# Patient Record
Sex: FEMALE | Race: WHITE | NOT HISPANIC OR LATINO
[De-identification: names, ages, dates, MRNs, and addresses within clinical notes are randomized per-mention and may not be internally consistent; named-entity substitution may affect disease eponyms.]

---

## 2017-01-26 ENCOUNTER — APPOINTMENT (OUTPATIENT)
Age: 77
End: 2017-01-26

## 2017-02-02 ENCOUNTER — APPOINTMENT (OUTPATIENT)
Age: 77
End: 2017-02-02

## 2017-02-02 DIAGNOSIS — L02.91 CUTANEOUS ABSCESS, UNSPECIFIED: ICD-10-CM

## 2017-02-16 ENCOUNTER — APPOINTMENT (OUTPATIENT)
Age: 77
End: 2017-02-16

## 2017-03-01 ENCOUNTER — APPOINTMENT (OUTPATIENT)
Dept: HEMATOLOGY ONCOLOGY | Facility: CLINIC | Age: 77
End: 2017-03-01

## 2017-03-09 ENCOUNTER — APPOINTMENT (OUTPATIENT)
Age: 77
End: 2017-03-09

## 2017-04-03 ENCOUNTER — APPOINTMENT (OUTPATIENT)
Dept: HEMATOLOGY ONCOLOGY | Facility: CLINIC | Age: 77
End: 2017-04-03

## 2017-04-03 VITALS
DIASTOLIC BLOOD PRESSURE: 41 MMHG | HEART RATE: 62 BPM | WEIGHT: 150 LBS | SYSTOLIC BLOOD PRESSURE: 121 MMHG | HEIGHT: 61 IN | BODY MASS INDEX: 28.32 KG/M2 | TEMPERATURE: 97.2 F | RESPIRATION RATE: 18 BRPM

## 2017-04-03 DIAGNOSIS — Z87.39 PERSONAL HISTORY OF OTHER DISEASES OF THE MUSCULOSKELETAL SYSTEM AND CONNECTIVE TISSUE: ICD-10-CM

## 2017-04-03 DIAGNOSIS — D64.9 ANEMIA, UNSPECIFIED: ICD-10-CM

## 2017-04-03 DIAGNOSIS — I27.2 OTHER SECONDARY PULMONARY HYPERTENSION: ICD-10-CM

## 2017-04-03 DIAGNOSIS — Z86.79 PERSONAL HISTORY OF OTHER DISEASES OF THE CIRCULATORY SYSTEM: ICD-10-CM

## 2017-04-03 DIAGNOSIS — I50.30 UNSPECIFIED DIASTOLIC (CONGESTIVE) HEART FAILURE: ICD-10-CM

## 2017-04-03 DIAGNOSIS — Z98.890 OTHER SPECIFIED POSTPROCEDURAL STATES: ICD-10-CM

## 2017-04-03 DIAGNOSIS — Z80.0 FAMILY HISTORY OF MALIGNANT NEOPLASM OF DIGESTIVE ORGANS: ICD-10-CM

## 2017-04-03 DIAGNOSIS — Z82.5 FAMILY HISTORY OF ASTHMA AND OTHER CHRONIC LOWER RESPIRATORY DISEASES: ICD-10-CM

## 2017-04-05 ENCOUNTER — RESULT REVIEW (OUTPATIENT)
Age: 77
End: 2017-04-05

## 2017-04-06 LAB — TSH SERPL DL<=0.005 MIU/L-ACNC: 1.17 UIU/ML

## 2017-05-17 ENCOUNTER — OUTPATIENT (OUTPATIENT)
Dept: OUTPATIENT SERVICES | Facility: HOSPITAL | Age: 77
LOS: 1 days | Discharge: HOME | End: 2017-05-17

## 2017-05-22 ENCOUNTER — OUTPATIENT (OUTPATIENT)
Dept: OUTPATIENT SERVICES | Facility: HOSPITAL | Age: 77
LOS: 1 days | Discharge: HOME | End: 2017-05-22

## 2017-06-05 ENCOUNTER — OUTPATIENT (OUTPATIENT)
Dept: OUTPATIENT SERVICES | Facility: HOSPITAL | Age: 77
LOS: 1 days | Discharge: HOME | End: 2017-06-05

## 2017-06-05 ENCOUNTER — APPOINTMENT (OUTPATIENT)
Dept: HEMATOLOGY ONCOLOGY | Facility: CLINIC | Age: 77
End: 2017-06-05

## 2017-06-05 VITALS
HEIGHT: 61 IN | DIASTOLIC BLOOD PRESSURE: 41 MMHG | TEMPERATURE: 96.1 F | SYSTOLIC BLOOD PRESSURE: 121 MMHG | BODY MASS INDEX: 28.7 KG/M2 | HEART RATE: 60 BPM | WEIGHT: 152 LBS

## 2017-06-05 DIAGNOSIS — I27.20 PULMONARY HYPERTENSION, UNSPECIFIED: ICD-10-CM

## 2017-06-05 DIAGNOSIS — L73.2 HIDRADENITIS SUPPURATIVA: ICD-10-CM

## 2017-06-05 DIAGNOSIS — I35.8 OTHER NONRHEUMATIC AORTIC VALVE DISORDERS: ICD-10-CM

## 2017-06-05 DIAGNOSIS — E11.9 TYPE 2 DIABETES MELLITUS WITHOUT COMPLICATIONS: ICD-10-CM

## 2017-06-05 DIAGNOSIS — Z00.00 ENCOUNTER FOR GENERAL ADULT MEDICAL EXAMINATION W/OUT ABNORMAL FINDINGS: ICD-10-CM

## 2017-06-05 DIAGNOSIS — I50.30 UNSPECIFIED DIASTOLIC (CONGESTIVE) HEART FAILURE: ICD-10-CM

## 2017-06-05 DIAGNOSIS — E78.5 HYPERLIPIDEMIA, UNSPECIFIED: ICD-10-CM

## 2017-06-05 DIAGNOSIS — I48.91 UNSPECIFIED ATRIAL FIBRILLATION: ICD-10-CM

## 2017-06-06 ENCOUNTER — OUTPATIENT (OUTPATIENT)
Dept: OUTPATIENT SERVICES | Facility: HOSPITAL | Age: 77
LOS: 1 days | Discharge: HOME | End: 2017-06-06

## 2017-06-06 DIAGNOSIS — E78.5 HYPERLIPIDEMIA, UNSPECIFIED: ICD-10-CM

## 2017-06-06 DIAGNOSIS — L73.2 HIDRADENITIS SUPPURATIVA: ICD-10-CM

## 2017-06-06 DIAGNOSIS — I48.91 UNSPECIFIED ATRIAL FIBRILLATION: ICD-10-CM

## 2017-06-06 DIAGNOSIS — I27.20 PULMONARY HYPERTENSION, UNSPECIFIED: ICD-10-CM

## 2017-06-06 DIAGNOSIS — E11.9 TYPE 2 DIABETES MELLITUS WITHOUT COMPLICATIONS: ICD-10-CM

## 2017-06-06 DIAGNOSIS — I50.30 UNSPECIFIED DIASTOLIC (CONGESTIVE) HEART FAILURE: ICD-10-CM

## 2017-06-06 DIAGNOSIS — I35.8 OTHER NONRHEUMATIC AORTIC VALVE DISORDERS: ICD-10-CM

## 2017-06-06 LAB
BASOPHILS # BLD: 0.02 TH/MM3
BASOPHILS NFR BLD: 0.3 %
EOSINOPHIL # BLD: 0.11 TH/MM3
EOSINOPHIL NFR BLD: 1.6 %
ERYTHROCYTE [DISTWIDTH] IN BLOOD BY AUTOMATED COUNT: 14.7 %
GRANULOCYTES # BLD: 4.47 TH/MM3
GRANULOCYTES NFR BLD: 66 %
HCT VFR BLD AUTO: 30 %
HGB BLD-MCNC: 9.7 G/DL
IMM GRANULOCYTES # BLD: 0.06 TH/MM3
IMM GRANULOCYTES NFR BLD: 0.9 %
LYMPHOCYTES # BLD: 1.55 TH/MM3
LYMPHOCYTES NFR BLD: 22.9 %
MCH RBC QN AUTO: 33.4 PG
MCHC RBC AUTO-ENTMCNC: 32.3 G/DL
MCV RBC AUTO: 103.4 FL
MONOCYTES # BLD: 0.56 TH/MM3
MONOCYTES NFR BLD: 8.3 %
PLATELET # BLD: 133 TH/MM3
PMV BLD AUTO: 11.5 FL
RBC # BLD AUTO: 2.9 MIL/MM3
WBC # BLD: 6.77 TH/MM3

## 2017-06-16 ENCOUNTER — OUTPATIENT (OUTPATIENT)
Dept: OUTPATIENT SERVICES | Facility: HOSPITAL | Age: 77
LOS: 1 days | Discharge: HOME | End: 2017-06-16

## 2017-06-16 DIAGNOSIS — I27.20 PULMONARY HYPERTENSION, UNSPECIFIED: ICD-10-CM

## 2017-06-16 DIAGNOSIS — I48.91 UNSPECIFIED ATRIAL FIBRILLATION: ICD-10-CM

## 2017-06-16 DIAGNOSIS — E78.5 HYPERLIPIDEMIA, UNSPECIFIED: ICD-10-CM

## 2017-06-16 DIAGNOSIS — I50.30 UNSPECIFIED DIASTOLIC (CONGESTIVE) HEART FAILURE: ICD-10-CM

## 2017-06-16 DIAGNOSIS — I35.8 OTHER NONRHEUMATIC AORTIC VALVE DISORDERS: ICD-10-CM

## 2017-06-16 DIAGNOSIS — L73.2 HIDRADENITIS SUPPURATIVA: ICD-10-CM

## 2017-06-16 DIAGNOSIS — E11.9 TYPE 2 DIABETES MELLITUS WITHOUT COMPLICATIONS: ICD-10-CM

## 2017-06-25 ENCOUNTER — INPATIENT (INPATIENT)
Facility: HOSPITAL | Age: 77
LOS: 11 days | Discharge: NURSING, REHAB OR LONG-TERM CARE | End: 2017-07-07
Attending: HOSPITALIST

## 2017-06-25 DIAGNOSIS — E78.5 HYPERLIPIDEMIA, UNSPECIFIED: ICD-10-CM

## 2017-06-25 DIAGNOSIS — E11.9 TYPE 2 DIABETES MELLITUS WITHOUT COMPLICATIONS: ICD-10-CM

## 2017-06-25 DIAGNOSIS — I48.91 UNSPECIFIED ATRIAL FIBRILLATION: ICD-10-CM

## 2017-06-25 DIAGNOSIS — I50.30 UNSPECIFIED DIASTOLIC (CONGESTIVE) HEART FAILURE: ICD-10-CM

## 2017-06-25 DIAGNOSIS — L73.2 HIDRADENITIS SUPPURATIVA: ICD-10-CM

## 2017-06-25 DIAGNOSIS — I27.20 PULMONARY HYPERTENSION, UNSPECIFIED: ICD-10-CM

## 2017-06-25 DIAGNOSIS — I35.8 OTHER NONRHEUMATIC AORTIC VALVE DISORDERS: ICD-10-CM

## 2017-06-28 DIAGNOSIS — R79.9 ABNORMAL FINDING OF BLOOD CHEMISTRY, UNSPECIFIED: ICD-10-CM

## 2017-07-08 ENCOUNTER — OUTPATIENT (OUTPATIENT)
Dept: OUTPATIENT SERVICES | Facility: HOSPITAL | Age: 77
LOS: 1 days | Discharge: HOME | End: 2017-07-08

## 2017-07-08 DIAGNOSIS — I35.8 OTHER NONRHEUMATIC AORTIC VALVE DISORDERS: ICD-10-CM

## 2017-07-08 DIAGNOSIS — E11.9 TYPE 2 DIABETES MELLITUS WITHOUT COMPLICATIONS: ICD-10-CM

## 2017-07-08 DIAGNOSIS — I50.30 UNSPECIFIED DIASTOLIC (CONGESTIVE) HEART FAILURE: ICD-10-CM

## 2017-07-08 DIAGNOSIS — I27.20 PULMONARY HYPERTENSION, UNSPECIFIED: ICD-10-CM

## 2017-07-08 DIAGNOSIS — E78.5 HYPERLIPIDEMIA, UNSPECIFIED: ICD-10-CM

## 2017-07-08 DIAGNOSIS — I48.91 UNSPECIFIED ATRIAL FIBRILLATION: ICD-10-CM

## 2017-07-08 DIAGNOSIS — L73.2 HIDRADENITIS SUPPURATIVA: ICD-10-CM

## 2017-07-08 DIAGNOSIS — R94.5 ABNORMAL RESULTS OF LIVER FUNCTION STUDIES: ICD-10-CM

## 2017-07-11 ENCOUNTER — OUTPATIENT (OUTPATIENT)
Dept: OUTPATIENT SERVICES | Facility: HOSPITAL | Age: 77
LOS: 1 days | Discharge: HOME | End: 2017-07-11

## 2017-07-11 DIAGNOSIS — I48.91 UNSPECIFIED ATRIAL FIBRILLATION: ICD-10-CM

## 2017-07-11 DIAGNOSIS — L73.2 HIDRADENITIS SUPPURATIVA: ICD-10-CM

## 2017-07-11 DIAGNOSIS — I27.20 PULMONARY HYPERTENSION, UNSPECIFIED: ICD-10-CM

## 2017-07-11 DIAGNOSIS — E11.9 TYPE 2 DIABETES MELLITUS WITHOUT COMPLICATIONS: ICD-10-CM

## 2017-07-11 DIAGNOSIS — I35.8 OTHER NONRHEUMATIC AORTIC VALVE DISORDERS: ICD-10-CM

## 2017-07-11 DIAGNOSIS — E78.5 HYPERLIPIDEMIA, UNSPECIFIED: ICD-10-CM

## 2017-07-11 DIAGNOSIS — I50.30 UNSPECIFIED DIASTOLIC (CONGESTIVE) HEART FAILURE: ICD-10-CM

## 2017-07-12 ENCOUNTER — OUTPATIENT (OUTPATIENT)
Dept: OUTPATIENT SERVICES | Facility: HOSPITAL | Age: 77
LOS: 1 days | Discharge: HOME | End: 2017-07-12

## 2017-07-12 DIAGNOSIS — I48.91 UNSPECIFIED ATRIAL FIBRILLATION: ICD-10-CM

## 2017-07-12 DIAGNOSIS — E78.5 HYPERLIPIDEMIA, UNSPECIFIED: ICD-10-CM

## 2017-07-12 DIAGNOSIS — I27.20 PULMONARY HYPERTENSION, UNSPECIFIED: ICD-10-CM

## 2017-07-12 DIAGNOSIS — L73.2 HIDRADENITIS SUPPURATIVA: ICD-10-CM

## 2017-07-12 DIAGNOSIS — I35.8 OTHER NONRHEUMATIC AORTIC VALVE DISORDERS: ICD-10-CM

## 2017-07-12 DIAGNOSIS — E11.9 TYPE 2 DIABETES MELLITUS WITHOUT COMPLICATIONS: ICD-10-CM

## 2017-07-12 DIAGNOSIS — I50.30 UNSPECIFIED DIASTOLIC (CONGESTIVE) HEART FAILURE: ICD-10-CM

## 2017-07-13 DIAGNOSIS — N18.3 CHRONIC KIDNEY DISEASE, STAGE 3 (MODERATE): ICD-10-CM

## 2017-07-13 DIAGNOSIS — I25.2 OLD MYOCARDIAL INFARCTION: ICD-10-CM

## 2017-07-13 DIAGNOSIS — E78.00 PURE HYPERCHOLESTEROLEMIA, UNSPECIFIED: ICD-10-CM

## 2017-07-13 DIAGNOSIS — R53.1 WEAKNESS: ICD-10-CM

## 2017-07-13 DIAGNOSIS — H40.9 UNSPECIFIED GLAUCOMA: ICD-10-CM

## 2017-07-13 DIAGNOSIS — N39.0 URINARY TRACT INFECTION, SITE NOT SPECIFIED: ICD-10-CM

## 2017-07-13 DIAGNOSIS — K20.9 ESOPHAGITIS, UNSPECIFIED: ICD-10-CM

## 2017-07-13 DIAGNOSIS — J18.9 PNEUMONIA, UNSPECIFIED ORGANISM: ICD-10-CM

## 2017-07-13 DIAGNOSIS — J69.0 PNEUMONITIS DUE TO INHALATION OF FOOD AND VOMIT: ICD-10-CM

## 2017-07-13 DIAGNOSIS — I08.0 RHEUMATIC DISORDERS OF BOTH MITRAL AND AORTIC VALVES: ICD-10-CM

## 2017-07-13 DIAGNOSIS — K21.9 GASTRO-ESOPHAGEAL REFLUX DISEASE WITHOUT ESOPHAGITIS: ICD-10-CM

## 2017-07-13 DIAGNOSIS — I48.91 UNSPECIFIED ATRIAL FIBRILLATION: ICD-10-CM

## 2017-07-13 DIAGNOSIS — I12.9 HYPERTENSIVE CHRONIC KIDNEY DISEASE WITH STAGE 1 THROUGH STAGE 4 CHRONIC KIDNEY DISEASE, OR UNSPECIFIED CHRONIC KIDNEY DISEASE: ICD-10-CM

## 2017-07-13 DIAGNOSIS — E87.6 HYPOKALEMIA: ICD-10-CM

## 2017-07-13 DIAGNOSIS — E87.5 HYPERKALEMIA: ICD-10-CM

## 2017-07-13 DIAGNOSIS — D64.9 ANEMIA, UNSPECIFIED: ICD-10-CM

## 2017-07-13 DIAGNOSIS — I25.10 ATHEROSCLEROTIC HEART DISEASE OF NATIVE CORONARY ARTERY WITHOUT ANGINA PECTORIS: ICD-10-CM

## 2017-07-13 DIAGNOSIS — F32.9 MAJOR DEPRESSIVE DISORDER, SINGLE EPISODE, UNSPECIFIED: ICD-10-CM

## 2017-07-13 DIAGNOSIS — E87.2 ACIDOSIS: ICD-10-CM

## 2017-07-13 DIAGNOSIS — I27.81 COR PULMONALE (CHRONIC): ICD-10-CM

## 2017-07-13 DIAGNOSIS — J44.9 CHRONIC OBSTRUCTIVE PULMONARY DISEASE, UNSPECIFIED: ICD-10-CM

## 2017-07-13 DIAGNOSIS — R53.81 OTHER MALAISE: ICD-10-CM

## 2017-07-13 DIAGNOSIS — I50.31 ACUTE DIASTOLIC (CONGESTIVE) HEART FAILURE: ICD-10-CM

## 2017-07-13 DIAGNOSIS — I27.2 OTHER SECONDARY PULMONARY HYPERTENSION: ICD-10-CM

## 2017-07-13 DIAGNOSIS — E83.42 HYPOMAGNESEMIA: ICD-10-CM

## 2017-07-14 ENCOUNTER — OUTPATIENT (OUTPATIENT)
Dept: OUTPATIENT SERVICES | Facility: HOSPITAL | Age: 77
LOS: 1 days | Discharge: HOME | End: 2017-07-14

## 2017-07-14 DIAGNOSIS — E78.5 HYPERLIPIDEMIA, UNSPECIFIED: ICD-10-CM

## 2017-07-14 DIAGNOSIS — I27.20 PULMONARY HYPERTENSION, UNSPECIFIED: ICD-10-CM

## 2017-07-14 DIAGNOSIS — E11.9 TYPE 2 DIABETES MELLITUS WITHOUT COMPLICATIONS: ICD-10-CM

## 2017-07-14 DIAGNOSIS — I50.9 HEART FAILURE, UNSPECIFIED: ICD-10-CM

## 2017-07-14 DIAGNOSIS — R79.9 ABNORMAL FINDING OF BLOOD CHEMISTRY, UNSPECIFIED: ICD-10-CM

## 2017-07-14 DIAGNOSIS — I35.8 OTHER NONRHEUMATIC AORTIC VALVE DISORDERS: ICD-10-CM

## 2017-07-14 DIAGNOSIS — I50.30 UNSPECIFIED DIASTOLIC (CONGESTIVE) HEART FAILURE: ICD-10-CM

## 2017-07-14 DIAGNOSIS — I48.91 UNSPECIFIED ATRIAL FIBRILLATION: ICD-10-CM

## 2017-07-14 DIAGNOSIS — L73.2 HIDRADENITIS SUPPURATIVA: ICD-10-CM

## 2017-07-14 DIAGNOSIS — I48.2 CHRONIC ATRIAL FIBRILLATION: ICD-10-CM

## 2017-07-15 ENCOUNTER — OUTPATIENT (OUTPATIENT)
Dept: OUTPATIENT SERVICES | Facility: HOSPITAL | Age: 77
LOS: 1 days | Discharge: HOME | End: 2017-07-15

## 2017-07-15 DIAGNOSIS — I48.91 UNSPECIFIED ATRIAL FIBRILLATION: ICD-10-CM

## 2017-07-15 DIAGNOSIS — E78.5 HYPERLIPIDEMIA, UNSPECIFIED: ICD-10-CM

## 2017-07-15 DIAGNOSIS — I27.20 PULMONARY HYPERTENSION, UNSPECIFIED: ICD-10-CM

## 2017-07-15 DIAGNOSIS — I35.8 OTHER NONRHEUMATIC AORTIC VALVE DISORDERS: ICD-10-CM

## 2017-07-15 DIAGNOSIS — I50.30 UNSPECIFIED DIASTOLIC (CONGESTIVE) HEART FAILURE: ICD-10-CM

## 2017-07-15 DIAGNOSIS — D64.9 ANEMIA, UNSPECIFIED: ICD-10-CM

## 2017-07-15 DIAGNOSIS — E11.9 TYPE 2 DIABETES MELLITUS WITHOUT COMPLICATIONS: ICD-10-CM

## 2017-07-15 DIAGNOSIS — L73.2 HIDRADENITIS SUPPURATIVA: ICD-10-CM

## 2017-07-17 ENCOUNTER — OUTPATIENT (OUTPATIENT)
Dept: OUTPATIENT SERVICES | Facility: HOSPITAL | Age: 77
LOS: 1 days | Discharge: HOME | End: 2017-07-17

## 2017-07-17 DIAGNOSIS — I48.91 UNSPECIFIED ATRIAL FIBRILLATION: ICD-10-CM

## 2017-07-17 DIAGNOSIS — I50.30 UNSPECIFIED DIASTOLIC (CONGESTIVE) HEART FAILURE: ICD-10-CM

## 2017-07-17 DIAGNOSIS — E11.9 TYPE 2 DIABETES MELLITUS WITHOUT COMPLICATIONS: ICD-10-CM

## 2017-07-17 DIAGNOSIS — E78.5 HYPERLIPIDEMIA, UNSPECIFIED: ICD-10-CM

## 2017-07-17 DIAGNOSIS — I27.20 PULMONARY HYPERTENSION, UNSPECIFIED: ICD-10-CM

## 2017-07-17 DIAGNOSIS — L73.2 HIDRADENITIS SUPPURATIVA: ICD-10-CM

## 2017-07-17 DIAGNOSIS — I35.8 OTHER NONRHEUMATIC AORTIC VALVE DISORDERS: ICD-10-CM

## 2017-07-18 DIAGNOSIS — D64.9 ANEMIA, UNSPECIFIED: ICD-10-CM

## 2017-07-18 DIAGNOSIS — R79.9 ABNORMAL FINDING OF BLOOD CHEMISTRY, UNSPECIFIED: ICD-10-CM

## 2017-07-20 ENCOUNTER — OUTPATIENT (OUTPATIENT)
Dept: OUTPATIENT SERVICES | Facility: HOSPITAL | Age: 77
LOS: 1 days | Discharge: HOME | End: 2017-07-20

## 2017-07-20 DIAGNOSIS — I48.91 UNSPECIFIED ATRIAL FIBRILLATION: ICD-10-CM

## 2017-07-20 DIAGNOSIS — E11.9 TYPE 2 DIABETES MELLITUS WITHOUT COMPLICATIONS: ICD-10-CM

## 2017-07-20 DIAGNOSIS — I27.20 PULMONARY HYPERTENSION, UNSPECIFIED: ICD-10-CM

## 2017-07-20 DIAGNOSIS — R79.9 ABNORMAL FINDING OF BLOOD CHEMISTRY, UNSPECIFIED: ICD-10-CM

## 2017-07-20 DIAGNOSIS — E78.5 HYPERLIPIDEMIA, UNSPECIFIED: ICD-10-CM

## 2017-07-20 DIAGNOSIS — I50.30 UNSPECIFIED DIASTOLIC (CONGESTIVE) HEART FAILURE: ICD-10-CM

## 2017-07-20 DIAGNOSIS — L73.2 HIDRADENITIS SUPPURATIVA: ICD-10-CM

## 2017-07-20 DIAGNOSIS — I35.8 OTHER NONRHEUMATIC AORTIC VALVE DISORDERS: ICD-10-CM

## 2017-07-21 ENCOUNTER — INPATIENT (INPATIENT)
Facility: HOSPITAL | Age: 77
LOS: 6 days | Discharge: NURSING, REHAB OR LONG-TERM CARE | End: 2017-07-28
Attending: HOSPITALIST

## 2017-07-21 DIAGNOSIS — I48.91 UNSPECIFIED ATRIAL FIBRILLATION: ICD-10-CM

## 2017-07-21 DIAGNOSIS — E11.9 TYPE 2 DIABETES MELLITUS WITHOUT COMPLICATIONS: ICD-10-CM

## 2017-07-21 DIAGNOSIS — I35.8 OTHER NONRHEUMATIC AORTIC VALVE DISORDERS: ICD-10-CM

## 2017-07-21 DIAGNOSIS — I50.30 UNSPECIFIED DIASTOLIC (CONGESTIVE) HEART FAILURE: ICD-10-CM

## 2017-07-21 DIAGNOSIS — I27.20 PULMONARY HYPERTENSION, UNSPECIFIED: ICD-10-CM

## 2017-07-21 DIAGNOSIS — E78.5 HYPERLIPIDEMIA, UNSPECIFIED: ICD-10-CM

## 2017-07-21 DIAGNOSIS — L73.2 HIDRADENITIS SUPPURATIVA: ICD-10-CM

## 2017-08-01 ENCOUNTER — OUTPATIENT (OUTPATIENT)
Dept: OUTPATIENT SERVICES | Facility: HOSPITAL | Age: 77
LOS: 1 days | Discharge: HOME | End: 2017-08-01

## 2017-08-01 DIAGNOSIS — Z86.718 PERSONAL HISTORY OF OTHER VENOUS THROMBOSIS AND EMBOLISM: ICD-10-CM

## 2017-08-01 DIAGNOSIS — I48.0 PAROXYSMAL ATRIAL FIBRILLATION: ICD-10-CM

## 2017-08-01 DIAGNOSIS — Z99.81 DEPENDENCE ON SUPPLEMENTAL OXYGEN: ICD-10-CM

## 2017-08-01 DIAGNOSIS — R29.6 REPEATED FALLS: ICD-10-CM

## 2017-08-01 DIAGNOSIS — I50.33 ACUTE ON CHRONIC DIASTOLIC (CONGESTIVE) HEART FAILURE: ICD-10-CM

## 2017-08-01 DIAGNOSIS — E11.9 TYPE 2 DIABETES MELLITUS WITHOUT COMPLICATIONS: ICD-10-CM

## 2017-08-01 DIAGNOSIS — J96.20 ACUTE AND CHRONIC RESPIRATORY FAILURE, UNSPECIFIED WHETHER WITH HYPOXIA OR HYPERCAPNIA: ICD-10-CM

## 2017-08-01 DIAGNOSIS — J44.9 CHRONIC OBSTRUCTIVE PULMONARY DISEASE, UNSPECIFIED: ICD-10-CM

## 2017-08-01 DIAGNOSIS — I50.30 UNSPECIFIED DIASTOLIC (CONGESTIVE) HEART FAILURE: ICD-10-CM

## 2017-08-01 DIAGNOSIS — I48.91 UNSPECIFIED ATRIAL FIBRILLATION: ICD-10-CM

## 2017-08-01 DIAGNOSIS — E78.5 HYPERLIPIDEMIA, UNSPECIFIED: ICD-10-CM

## 2017-08-01 DIAGNOSIS — E46 UNSPECIFIED PROTEIN-CALORIE MALNUTRITION: ICD-10-CM

## 2017-08-01 DIAGNOSIS — I13.0 HYPERTENSIVE HEART AND CHRONIC KIDNEY DISEASE WITH HEART FAILURE AND STAGE 1 THROUGH STAGE 4 CHRONIC KIDNEY DISEASE, OR UNSPECIFIED CHRONIC KIDNEY DISEASE: ICD-10-CM

## 2017-08-01 DIAGNOSIS — E11.22 TYPE 2 DIABETES MELLITUS WITH DIABETIC CHRONIC KIDNEY DISEASE: ICD-10-CM

## 2017-08-01 DIAGNOSIS — E83.42 HYPOMAGNESEMIA: ICD-10-CM

## 2017-08-01 DIAGNOSIS — I50.9 HEART FAILURE, UNSPECIFIED: ICD-10-CM

## 2017-08-01 DIAGNOSIS — I27.20 PULMONARY HYPERTENSION, UNSPECIFIED: ICD-10-CM

## 2017-08-01 DIAGNOSIS — M10.9 GOUT, UNSPECIFIED: ICD-10-CM

## 2017-08-01 DIAGNOSIS — Z79.01 LONG TERM (CURRENT) USE OF ANTICOAGULANTS: ICD-10-CM

## 2017-08-01 DIAGNOSIS — E11.59 TYPE 2 DIABETES MELLITUS WITH OTHER CIRCULATORY COMPLICATIONS: ICD-10-CM

## 2017-08-01 DIAGNOSIS — L73.2 HIDRADENITIS SUPPURATIVA: ICD-10-CM

## 2017-08-01 DIAGNOSIS — I35.8 OTHER NONRHEUMATIC AORTIC VALVE DISORDERS: ICD-10-CM

## 2017-08-01 DIAGNOSIS — I27.2 OTHER SECONDARY PULMONARY HYPERTENSION: ICD-10-CM

## 2017-08-01 DIAGNOSIS — D50.9 IRON DEFICIENCY ANEMIA, UNSPECIFIED: ICD-10-CM

## 2017-08-01 DIAGNOSIS — R79.9 ABNORMAL FINDING OF BLOOD CHEMISTRY, UNSPECIFIED: ICD-10-CM

## 2017-08-01 DIAGNOSIS — N18.4 CHRONIC KIDNEY DISEASE, STAGE 4 (SEVERE): ICD-10-CM

## 2017-08-03 ENCOUNTER — OUTPATIENT (OUTPATIENT)
Dept: OUTPATIENT SERVICES | Facility: HOSPITAL | Age: 77
LOS: 1 days | Discharge: HOME | End: 2017-08-03

## 2017-08-03 DIAGNOSIS — I35.8 OTHER NONRHEUMATIC AORTIC VALVE DISORDERS: ICD-10-CM

## 2017-08-03 DIAGNOSIS — I27.20 PULMONARY HYPERTENSION, UNSPECIFIED: ICD-10-CM

## 2017-08-03 DIAGNOSIS — E11.9 TYPE 2 DIABETES MELLITUS WITHOUT COMPLICATIONS: ICD-10-CM

## 2017-08-03 DIAGNOSIS — L73.2 HIDRADENITIS SUPPURATIVA: ICD-10-CM

## 2017-08-03 DIAGNOSIS — R78.9 FINDING OF UNSPECIFIED SUBSTANCE, NOT NORMALLY FOUND IN BLOOD: ICD-10-CM

## 2017-08-03 DIAGNOSIS — E78.5 HYPERLIPIDEMIA, UNSPECIFIED: ICD-10-CM

## 2017-08-03 DIAGNOSIS — I50.30 UNSPECIFIED DIASTOLIC (CONGESTIVE) HEART FAILURE: ICD-10-CM

## 2017-08-03 DIAGNOSIS — I48.91 UNSPECIFIED ATRIAL FIBRILLATION: ICD-10-CM

## 2017-08-04 ENCOUNTER — OUTPATIENT (OUTPATIENT)
Dept: OUTPATIENT SERVICES | Facility: HOSPITAL | Age: 77
LOS: 1 days | Discharge: HOME | End: 2017-08-04

## 2017-08-04 DIAGNOSIS — D64.9 ANEMIA, UNSPECIFIED: ICD-10-CM

## 2017-08-04 DIAGNOSIS — R79.9 ABNORMAL FINDING OF BLOOD CHEMISTRY, UNSPECIFIED: ICD-10-CM

## 2017-08-04 DIAGNOSIS — L73.2 HIDRADENITIS SUPPURATIVA: ICD-10-CM

## 2017-08-04 DIAGNOSIS — E78.5 HYPERLIPIDEMIA, UNSPECIFIED: ICD-10-CM

## 2017-08-04 DIAGNOSIS — D50.9 IRON DEFICIENCY ANEMIA, UNSPECIFIED: ICD-10-CM

## 2017-08-04 DIAGNOSIS — E11.9 TYPE 2 DIABETES MELLITUS WITHOUT COMPLICATIONS: ICD-10-CM

## 2017-08-04 DIAGNOSIS — I50.30 UNSPECIFIED DIASTOLIC (CONGESTIVE) HEART FAILURE: ICD-10-CM

## 2017-08-04 DIAGNOSIS — I48.91 UNSPECIFIED ATRIAL FIBRILLATION: ICD-10-CM

## 2017-08-04 DIAGNOSIS — I27.20 PULMONARY HYPERTENSION, UNSPECIFIED: ICD-10-CM

## 2017-08-04 DIAGNOSIS — I35.8 OTHER NONRHEUMATIC AORTIC VALVE DISORDERS: ICD-10-CM

## 2017-08-08 ENCOUNTER — OUTPATIENT (OUTPATIENT)
Dept: OUTPATIENT SERVICES | Facility: HOSPITAL | Age: 77
LOS: 1 days | Discharge: HOME | End: 2017-08-08

## 2017-08-08 DIAGNOSIS — I35.8 OTHER NONRHEUMATIC AORTIC VALVE DISORDERS: ICD-10-CM

## 2017-08-08 DIAGNOSIS — E78.5 HYPERLIPIDEMIA, UNSPECIFIED: ICD-10-CM

## 2017-08-08 DIAGNOSIS — L73.2 HIDRADENITIS SUPPURATIVA: ICD-10-CM

## 2017-08-08 DIAGNOSIS — R79.9 ABNORMAL FINDING OF BLOOD CHEMISTRY, UNSPECIFIED: ICD-10-CM

## 2017-08-08 DIAGNOSIS — I48.91 UNSPECIFIED ATRIAL FIBRILLATION: ICD-10-CM

## 2017-08-08 DIAGNOSIS — I50.30 UNSPECIFIED DIASTOLIC (CONGESTIVE) HEART FAILURE: ICD-10-CM

## 2017-08-08 DIAGNOSIS — E11.9 TYPE 2 DIABETES MELLITUS WITHOUT COMPLICATIONS: ICD-10-CM

## 2017-08-08 DIAGNOSIS — I27.20 PULMONARY HYPERTENSION, UNSPECIFIED: ICD-10-CM

## 2017-08-10 ENCOUNTER — OUTPATIENT (OUTPATIENT)
Dept: OUTPATIENT SERVICES | Facility: HOSPITAL | Age: 77
LOS: 1 days | Discharge: HOME | End: 2017-08-10

## 2017-08-10 DIAGNOSIS — I35.8 OTHER NONRHEUMATIC AORTIC VALVE DISORDERS: ICD-10-CM

## 2017-08-10 DIAGNOSIS — L73.2 HIDRADENITIS SUPPURATIVA: ICD-10-CM

## 2017-08-10 DIAGNOSIS — E11.9 TYPE 2 DIABETES MELLITUS WITHOUT COMPLICATIONS: ICD-10-CM

## 2017-08-10 DIAGNOSIS — I27.20 PULMONARY HYPERTENSION, UNSPECIFIED: ICD-10-CM

## 2017-08-10 DIAGNOSIS — R79.9 ABNORMAL FINDING OF BLOOD CHEMISTRY, UNSPECIFIED: ICD-10-CM

## 2017-08-10 DIAGNOSIS — I50.30 UNSPECIFIED DIASTOLIC (CONGESTIVE) HEART FAILURE: ICD-10-CM

## 2017-08-10 DIAGNOSIS — I48.91 UNSPECIFIED ATRIAL FIBRILLATION: ICD-10-CM

## 2017-08-10 DIAGNOSIS — E78.5 HYPERLIPIDEMIA, UNSPECIFIED: ICD-10-CM

## 2017-08-15 ENCOUNTER — OUTPATIENT (OUTPATIENT)
Dept: OUTPATIENT SERVICES | Facility: HOSPITAL | Age: 77
LOS: 1 days | Discharge: HOME | End: 2017-08-15

## 2017-08-15 DIAGNOSIS — I50.30 UNSPECIFIED DIASTOLIC (CONGESTIVE) HEART FAILURE: ICD-10-CM

## 2017-08-15 DIAGNOSIS — L73.2 HIDRADENITIS SUPPURATIVA: ICD-10-CM

## 2017-08-15 DIAGNOSIS — I35.8 OTHER NONRHEUMATIC AORTIC VALVE DISORDERS: ICD-10-CM

## 2017-08-15 DIAGNOSIS — R79.9 ABNORMAL FINDING OF BLOOD CHEMISTRY, UNSPECIFIED: ICD-10-CM

## 2017-08-15 DIAGNOSIS — E11.9 TYPE 2 DIABETES MELLITUS WITHOUT COMPLICATIONS: ICD-10-CM

## 2017-08-15 DIAGNOSIS — E78.5 HYPERLIPIDEMIA, UNSPECIFIED: ICD-10-CM

## 2017-08-15 DIAGNOSIS — I27.20 PULMONARY HYPERTENSION, UNSPECIFIED: ICD-10-CM

## 2017-08-15 DIAGNOSIS — I48.91 UNSPECIFIED ATRIAL FIBRILLATION: ICD-10-CM

## 2017-08-22 ENCOUNTER — OUTPATIENT (OUTPATIENT)
Dept: OUTPATIENT SERVICES | Facility: HOSPITAL | Age: 77
LOS: 1 days | Discharge: HOME | End: 2017-08-22

## 2017-08-22 DIAGNOSIS — I50.30 UNSPECIFIED DIASTOLIC (CONGESTIVE) HEART FAILURE: ICD-10-CM

## 2017-08-22 DIAGNOSIS — E11.9 TYPE 2 DIABETES MELLITUS WITHOUT COMPLICATIONS: ICD-10-CM

## 2017-08-22 DIAGNOSIS — I27.20 PULMONARY HYPERTENSION, UNSPECIFIED: ICD-10-CM

## 2017-08-22 DIAGNOSIS — L73.2 HIDRADENITIS SUPPURATIVA: ICD-10-CM

## 2017-08-22 DIAGNOSIS — E78.5 HYPERLIPIDEMIA, UNSPECIFIED: ICD-10-CM

## 2017-08-22 DIAGNOSIS — I35.8 OTHER NONRHEUMATIC AORTIC VALVE DISORDERS: ICD-10-CM

## 2017-08-22 DIAGNOSIS — D64.9 ANEMIA, UNSPECIFIED: ICD-10-CM

## 2017-08-22 DIAGNOSIS — I48.91 UNSPECIFIED ATRIAL FIBRILLATION: ICD-10-CM

## 2017-08-24 ENCOUNTER — OUTPATIENT (OUTPATIENT)
Dept: OUTPATIENT SERVICES | Facility: HOSPITAL | Age: 77
LOS: 1 days | Discharge: HOME | End: 2017-08-24

## 2017-08-24 DIAGNOSIS — R79.89 OTHER SPECIFIED ABNORMAL FINDINGS OF BLOOD CHEMISTRY: ICD-10-CM

## 2017-08-24 DIAGNOSIS — E78.5 HYPERLIPIDEMIA, UNSPECIFIED: ICD-10-CM

## 2017-08-24 DIAGNOSIS — I27.20 PULMONARY HYPERTENSION, UNSPECIFIED: ICD-10-CM

## 2017-08-24 DIAGNOSIS — E11.9 TYPE 2 DIABETES MELLITUS WITHOUT COMPLICATIONS: ICD-10-CM

## 2017-08-24 DIAGNOSIS — I48.91 UNSPECIFIED ATRIAL FIBRILLATION: ICD-10-CM

## 2017-08-24 DIAGNOSIS — I50.30 UNSPECIFIED DIASTOLIC (CONGESTIVE) HEART FAILURE: ICD-10-CM

## 2017-08-24 DIAGNOSIS — I35.8 OTHER NONRHEUMATIC AORTIC VALVE DISORDERS: ICD-10-CM

## 2017-08-24 DIAGNOSIS — L73.2 HIDRADENITIS SUPPURATIVA: ICD-10-CM

## 2017-10-18 ENCOUNTER — OUTPATIENT (OUTPATIENT)
Dept: OUTPATIENT SERVICES | Facility: HOSPITAL | Age: 77
LOS: 1 days | Discharge: HOME | End: 2017-10-18

## 2017-10-18 DIAGNOSIS — N39.0 URINARY TRACT INFECTION, SITE NOT SPECIFIED: ICD-10-CM

## 2017-10-18 DIAGNOSIS — E11.9 TYPE 2 DIABETES MELLITUS WITHOUT COMPLICATIONS: ICD-10-CM

## 2017-10-18 DIAGNOSIS — I27.20 PULMONARY HYPERTENSION, UNSPECIFIED: ICD-10-CM

## 2017-10-18 DIAGNOSIS — I48.91 UNSPECIFIED ATRIAL FIBRILLATION: ICD-10-CM

## 2017-10-18 DIAGNOSIS — E78.5 HYPERLIPIDEMIA, UNSPECIFIED: ICD-10-CM

## 2017-10-18 DIAGNOSIS — L73.2 HIDRADENITIS SUPPURATIVA: ICD-10-CM

## 2017-10-18 DIAGNOSIS — I50.30 UNSPECIFIED DIASTOLIC (CONGESTIVE) HEART FAILURE: ICD-10-CM

## 2017-10-18 DIAGNOSIS — I35.8 OTHER NONRHEUMATIC AORTIC VALVE DISORDERS: ICD-10-CM

## 2017-10-30 ENCOUNTER — OUTPATIENT (OUTPATIENT)
Dept: OUTPATIENT SERVICES | Facility: HOSPITAL | Age: 77
LOS: 1 days | Discharge: HOME | End: 2017-10-30

## 2017-10-30 DIAGNOSIS — E11.9 TYPE 2 DIABETES MELLITUS WITHOUT COMPLICATIONS: ICD-10-CM

## 2017-10-30 DIAGNOSIS — I35.8 OTHER NONRHEUMATIC AORTIC VALVE DISORDERS: ICD-10-CM

## 2017-10-30 DIAGNOSIS — I48.91 UNSPECIFIED ATRIAL FIBRILLATION: ICD-10-CM

## 2017-10-30 DIAGNOSIS — D64.9 ANEMIA, UNSPECIFIED: ICD-10-CM

## 2017-10-30 DIAGNOSIS — L73.2 HIDRADENITIS SUPPURATIVA: ICD-10-CM

## 2017-10-30 DIAGNOSIS — E78.5 HYPERLIPIDEMIA, UNSPECIFIED: ICD-10-CM

## 2017-10-30 DIAGNOSIS — I27.20 PULMONARY HYPERTENSION, UNSPECIFIED: ICD-10-CM

## 2017-10-30 DIAGNOSIS — I50.30 UNSPECIFIED DIASTOLIC (CONGESTIVE) HEART FAILURE: ICD-10-CM

## 2017-10-31 ENCOUNTER — INPATIENT (INPATIENT)
Facility: HOSPITAL | Age: 77
LOS: 6 days | Discharge: NURSING, REHAB OR LONG-TERM CARE | End: 2017-11-07
Attending: INTERNAL MEDICINE

## 2017-10-31 DIAGNOSIS — L73.2 HIDRADENITIS SUPPURATIVA: ICD-10-CM

## 2017-10-31 DIAGNOSIS — I48.91 UNSPECIFIED ATRIAL FIBRILLATION: ICD-10-CM

## 2017-10-31 DIAGNOSIS — I50.30 UNSPECIFIED DIASTOLIC (CONGESTIVE) HEART FAILURE: ICD-10-CM

## 2017-10-31 DIAGNOSIS — I35.8 OTHER NONRHEUMATIC AORTIC VALVE DISORDERS: ICD-10-CM

## 2017-10-31 DIAGNOSIS — E11.9 TYPE 2 DIABETES MELLITUS WITHOUT COMPLICATIONS: ICD-10-CM

## 2017-10-31 DIAGNOSIS — E78.5 HYPERLIPIDEMIA, UNSPECIFIED: ICD-10-CM

## 2017-10-31 DIAGNOSIS — I27.20 PULMONARY HYPERTENSION, UNSPECIFIED: ICD-10-CM

## 2017-11-09 DIAGNOSIS — E87.5 HYPERKALEMIA: ICD-10-CM

## 2017-11-09 DIAGNOSIS — E87.2 ACIDOSIS: ICD-10-CM

## 2017-11-09 DIAGNOSIS — N18.3 CHRONIC KIDNEY DISEASE, STAGE 3 (MODERATE): ICD-10-CM

## 2017-11-09 DIAGNOSIS — I35.1 NONRHEUMATIC AORTIC (VALVE) INSUFFICIENCY: ICD-10-CM

## 2017-11-09 DIAGNOSIS — D50.9 IRON DEFICIENCY ANEMIA, UNSPECIFIED: ICD-10-CM

## 2017-11-09 DIAGNOSIS — K59.00 CONSTIPATION, UNSPECIFIED: ICD-10-CM

## 2017-11-09 DIAGNOSIS — L89.159 PRESSURE ULCER OF SACRAL REGION, UNSPECIFIED STAGE: ICD-10-CM

## 2017-11-09 DIAGNOSIS — N17.9 ACUTE KIDNEY FAILURE, UNSPECIFIED: ICD-10-CM

## 2017-11-09 DIAGNOSIS — I27.20 PULMONARY HYPERTENSION, UNSPECIFIED: ICD-10-CM

## 2017-11-09 DIAGNOSIS — E87.1 HYPO-OSMOLALITY AND HYPONATREMIA: ICD-10-CM

## 2017-11-09 DIAGNOSIS — E11.22 TYPE 2 DIABETES MELLITUS WITH DIABETIC CHRONIC KIDNEY DISEASE: ICD-10-CM

## 2017-11-09 DIAGNOSIS — I50.32 CHRONIC DIASTOLIC (CONGESTIVE) HEART FAILURE: ICD-10-CM

## 2017-11-09 DIAGNOSIS — J15.6 PNEUMONIA DUE TO OTHER GRAM-NEGATIVE BACTERIA: ICD-10-CM

## 2017-11-09 DIAGNOSIS — Z79.01 LONG TERM (CURRENT) USE OF ANTICOAGULANTS: ICD-10-CM

## 2017-11-09 DIAGNOSIS — I35.0 NONRHEUMATIC AORTIC (VALVE) STENOSIS: ICD-10-CM

## 2017-11-09 DIAGNOSIS — J96.11 CHRONIC RESPIRATORY FAILURE WITH HYPOXIA: ICD-10-CM

## 2017-11-09 DIAGNOSIS — I13.0 HYPERTENSIVE HEART AND CHRONIC KIDNEY DISEASE WITH HEART FAILURE AND STAGE 1 THROUGH STAGE 4 CHRONIC KIDNEY DISEASE, OR UNSPECIFIED CHRONIC KIDNEY DISEASE: ICD-10-CM

## 2017-11-09 DIAGNOSIS — D63.1 ANEMIA IN CHRONIC KIDNEY DISEASE: ICD-10-CM

## 2017-11-09 DIAGNOSIS — I48.91 UNSPECIFIED ATRIAL FIBRILLATION: ICD-10-CM

## 2017-12-04 ENCOUNTER — APPOINTMENT (OUTPATIENT)
Dept: HEMATOLOGY ONCOLOGY | Facility: CLINIC | Age: 77
End: 2017-12-04

## 2017-12-04 VITALS
HEART RATE: 65 BPM | BODY MASS INDEX: 28.7 KG/M2 | WEIGHT: 152 LBS | SYSTOLIC BLOOD PRESSURE: 137 MMHG | TEMPERATURE: 96.2 F | RESPIRATION RATE: 17 BRPM | HEIGHT: 61 IN | DIASTOLIC BLOOD PRESSURE: 57 MMHG

## 2017-12-04 RX ORDER — METOPROLOL TARTRATE 25 MG/1
25 TABLET, FILM COATED ORAL
Qty: 60 | Refills: 0 | Status: ACTIVE | COMMUNITY
Start: 2017-06-21

## 2017-12-04 RX ORDER — SPIRONOLACTONE 25 MG/1
25 TABLET ORAL
Qty: 60 | Refills: 0 | Status: ACTIVE | COMMUNITY
Start: 2017-07-07

## 2017-12-04 RX ORDER — DORZOLAMIDE HYDROCHLORIDE 20 MG/ML
2 SOLUTION OPHTHALMIC
Qty: 10 | Refills: 0 | Status: ACTIVE | COMMUNITY
Start: 2017-07-07

## 2017-12-04 RX ORDER — OXYCODONE AND ACETAMINOPHEN 5; 325 MG/1; MG/1
5-325 TABLET ORAL
Qty: 40 | Refills: 0 | Status: ACTIVE | COMMUNITY
Start: 2017-07-07

## 2017-12-04 RX ORDER — ERYTHROPOIETIN 20000 [IU]/ML
20000 INJECTION, SOLUTION INTRAVENOUS; SUBCUTANEOUS
Qty: 4 | Refills: 0 | Status: ACTIVE | COMMUNITY
Start: 2017-07-07

## 2017-12-04 RX ORDER — CYANOCOBALAMIN 1000 UG/ML
1000 INJECTION INTRAMUSCULAR; SUBCUTANEOUS
Refills: 0 | Status: ACTIVE | COMMUNITY

## 2017-12-04 RX ORDER — PANTOPRAZOLE 40 MG/1
40 TABLET, DELAYED RELEASE ORAL
Qty: 30 | Refills: 0 | Status: ACTIVE | COMMUNITY
Start: 2017-07-07

## 2017-12-04 RX ORDER — HEPARIN SODIUM,PORCINE 10 UNIT/ML
10 SYRINGE (ML) INTRAVENOUS
Refills: 0 | Status: ACTIVE | COMMUNITY

## 2017-12-04 RX ORDER — APIXABAN 2.5 MG/1
2.5 TABLET, FILM COATED ORAL
Qty: 28 | Refills: 0 | Status: ACTIVE | COMMUNITY
Start: 2017-07-19

## 2017-12-04 RX ORDER — SALINE NASAL SPRAY 1.5 OZ
0.65 SOLUTION NASAL
Refills: 0 | Status: ACTIVE | COMMUNITY

## 2017-12-04 RX ORDER — CHLORHEXIDINE GLUCONATE 4 %
325 (65 FE) LIQUID (ML) TOPICAL
Refills: 0 | Status: ACTIVE | COMMUNITY

## 2017-12-04 RX ORDER — IPRATROPIUM BROMIDE AND ALBUTEROL SULFATE 2.5; .5 MG/3ML; MG/3ML
0.5-2.5 (3) SOLUTION RESPIRATORY (INHALATION)
Qty: 90 | Refills: 0 | Status: ACTIVE | COMMUNITY
Start: 2017-07-07

## 2017-12-04 RX ORDER — LATANOPROST/PF 0.005 %
0.01 DROPS OPHTHALMIC (EYE)
Qty: 2 | Refills: 0 | Status: ACTIVE | COMMUNITY
Start: 2017-07-07

## 2017-12-04 RX ORDER — DOCUSATE SODIUM 100 MG/1
100 CAPSULE ORAL
Refills: 0 | Status: ACTIVE | COMMUNITY

## 2017-12-04 RX ORDER — TIMOLOL MALEATE 5 MG/ML
0.5 SOLUTION OPHTHALMIC
Qty: 5 | Refills: 0 | Status: ACTIVE | COMMUNITY
Start: 2017-06-20

## 2017-12-04 RX ORDER — FUROSEMIDE 40 MG/1
40 TABLET ORAL
Qty: 14 | Refills: 0 | Status: ACTIVE | COMMUNITY
Start: 2017-07-13

## 2017-12-04 RX ORDER — ACETAMINOPHEN 325 MG/1
325 TABLET ORAL
Refills: 0 | Status: ACTIVE | COMMUNITY

## 2017-12-04 RX ORDER — FOLIC ACID 1 MG/1
1 TABLET ORAL
Refills: 0 | Status: ACTIVE | COMMUNITY

## 2017-12-04 RX ORDER — SILDENAFIL 20 MG/1
20 TABLET ORAL
Qty: 90 | Refills: 0 | Status: ACTIVE | COMMUNITY
Start: 2017-07-07

## 2017-12-04 RX ORDER — ATORVASTATIN CALCIUM 10 MG/1
10 TABLET, FILM COATED ORAL
Qty: 30 | Refills: 0 | Status: ACTIVE | COMMUNITY
Start: 2017-07-07

## 2017-12-04 RX ORDER — NYSTATIN 100000 [USP'U]/G
100000 CREAM TOPICAL
Refills: 0 | Status: ACTIVE | COMMUNITY

## 2017-12-04 RX ORDER — SIMETHICONE 80 MG/1
80 TABLET, CHEWABLE ORAL
Refills: 0 | Status: ACTIVE | COMMUNITY

## 2017-12-04 RX ORDER — ALLOPURINOL 300 MG/1
300 TABLET ORAL
Qty: 14 | Refills: 0 | Status: ACTIVE | COMMUNITY
Start: 2017-06-24

## 2017-12-05 ENCOUNTER — OUTPATIENT (OUTPATIENT)
Dept: OUTPATIENT SERVICES | Facility: HOSPITAL | Age: 77
LOS: 1 days | Discharge: HOME | End: 2017-12-05

## 2017-12-05 DIAGNOSIS — I27.20 PULMONARY HYPERTENSION, UNSPECIFIED: ICD-10-CM

## 2017-12-05 DIAGNOSIS — E78.5 HYPERLIPIDEMIA, UNSPECIFIED: ICD-10-CM

## 2017-12-05 DIAGNOSIS — D64.9 ANEMIA, UNSPECIFIED: ICD-10-CM

## 2017-12-05 DIAGNOSIS — I48.91 UNSPECIFIED ATRIAL FIBRILLATION: ICD-10-CM

## 2017-12-05 DIAGNOSIS — E11.9 TYPE 2 DIABETES MELLITUS WITHOUT COMPLICATIONS: ICD-10-CM

## 2017-12-05 DIAGNOSIS — I35.8 OTHER NONRHEUMATIC AORTIC VALVE DISORDERS: ICD-10-CM

## 2017-12-05 DIAGNOSIS — L73.2 HIDRADENITIS SUPPURATIVA: ICD-10-CM

## 2017-12-05 DIAGNOSIS — I50.30 UNSPECIFIED DIASTOLIC (CONGESTIVE) HEART FAILURE: ICD-10-CM

## 2017-12-06 ENCOUNTER — OUTPATIENT (OUTPATIENT)
Dept: OUTPATIENT SERVICES | Facility: HOSPITAL | Age: 77
LOS: 1 days | Discharge: HOME | End: 2017-12-06

## 2017-12-06 DIAGNOSIS — I48.91 UNSPECIFIED ATRIAL FIBRILLATION: ICD-10-CM

## 2017-12-06 DIAGNOSIS — I35.8 OTHER NONRHEUMATIC AORTIC VALVE DISORDERS: ICD-10-CM

## 2017-12-06 DIAGNOSIS — L73.2 HIDRADENITIS SUPPURATIVA: ICD-10-CM

## 2017-12-06 DIAGNOSIS — E11.9 TYPE 2 DIABETES MELLITUS WITHOUT COMPLICATIONS: ICD-10-CM

## 2017-12-06 DIAGNOSIS — I10 ESSENTIAL (PRIMARY) HYPERTENSION: ICD-10-CM

## 2017-12-06 DIAGNOSIS — I27.20 PULMONARY HYPERTENSION, UNSPECIFIED: ICD-10-CM

## 2017-12-06 DIAGNOSIS — I50.30 UNSPECIFIED DIASTOLIC (CONGESTIVE) HEART FAILURE: ICD-10-CM

## 2017-12-06 DIAGNOSIS — Z02.9 ENCOUNTER FOR ADMINISTRATIVE EXAMINATIONS, UNSPECIFIED: ICD-10-CM

## 2017-12-06 DIAGNOSIS — E78.5 HYPERLIPIDEMIA, UNSPECIFIED: ICD-10-CM

## 2017-12-07 ENCOUNTER — OUTPATIENT (OUTPATIENT)
Dept: OUTPATIENT SERVICES | Facility: HOSPITAL | Age: 77
LOS: 1 days | Discharge: HOME | End: 2017-12-07

## 2017-12-07 DIAGNOSIS — I48.91 UNSPECIFIED ATRIAL FIBRILLATION: ICD-10-CM

## 2017-12-07 DIAGNOSIS — E11.9 TYPE 2 DIABETES MELLITUS WITHOUT COMPLICATIONS: ICD-10-CM

## 2017-12-07 DIAGNOSIS — L73.2 HIDRADENITIS SUPPURATIVA: ICD-10-CM

## 2017-12-07 DIAGNOSIS — Z02.9 ENCOUNTER FOR ADMINISTRATIVE EXAMINATIONS, UNSPECIFIED: ICD-10-CM

## 2017-12-07 DIAGNOSIS — E78.5 HYPERLIPIDEMIA, UNSPECIFIED: ICD-10-CM

## 2017-12-07 DIAGNOSIS — I27.20 PULMONARY HYPERTENSION, UNSPECIFIED: ICD-10-CM

## 2017-12-07 DIAGNOSIS — I50.30 UNSPECIFIED DIASTOLIC (CONGESTIVE) HEART FAILURE: ICD-10-CM

## 2017-12-07 DIAGNOSIS — R94.4 ABNORMAL RESULTS OF KIDNEY FUNCTION STUDIES: ICD-10-CM

## 2017-12-07 DIAGNOSIS — I10 ESSENTIAL (PRIMARY) HYPERTENSION: ICD-10-CM

## 2017-12-07 DIAGNOSIS — I35.8 OTHER NONRHEUMATIC AORTIC VALVE DISORDERS: ICD-10-CM

## 2017-12-21 ENCOUNTER — OUTPATIENT (OUTPATIENT)
Dept: OUTPATIENT SERVICES | Facility: HOSPITAL | Age: 77
LOS: 1 days | Discharge: HOME | End: 2017-12-21

## 2017-12-21 DIAGNOSIS — I35.8 OTHER NONRHEUMATIC AORTIC VALVE DISORDERS: ICD-10-CM

## 2017-12-21 DIAGNOSIS — R79.9 ABNORMAL FINDING OF BLOOD CHEMISTRY, UNSPECIFIED: ICD-10-CM

## 2017-12-21 DIAGNOSIS — I48.91 UNSPECIFIED ATRIAL FIBRILLATION: ICD-10-CM

## 2017-12-21 DIAGNOSIS — E11.9 TYPE 2 DIABETES MELLITUS WITHOUT COMPLICATIONS: ICD-10-CM

## 2017-12-21 DIAGNOSIS — I50.30 UNSPECIFIED DIASTOLIC (CONGESTIVE) HEART FAILURE: ICD-10-CM

## 2017-12-21 DIAGNOSIS — L73.2 HIDRADENITIS SUPPURATIVA: ICD-10-CM

## 2017-12-21 DIAGNOSIS — I27.20 PULMONARY HYPERTENSION, UNSPECIFIED: ICD-10-CM

## 2017-12-21 DIAGNOSIS — E78.5 HYPERLIPIDEMIA, UNSPECIFIED: ICD-10-CM

## 2018-01-13 ENCOUNTER — INPATIENT (INPATIENT)
Facility: HOSPITAL | Age: 78
LOS: 5 days | Discharge: NURSING, REHAB OR LONG-TERM CARE | End: 2018-01-19
Attending: INTERNAL MEDICINE

## 2018-01-13 DIAGNOSIS — E78.5 HYPERLIPIDEMIA, UNSPECIFIED: ICD-10-CM

## 2018-01-13 DIAGNOSIS — I48.91 UNSPECIFIED ATRIAL FIBRILLATION: ICD-10-CM

## 2018-01-13 DIAGNOSIS — L73.2 HIDRADENITIS SUPPURATIVA: ICD-10-CM

## 2018-01-13 DIAGNOSIS — I35.8 OTHER NONRHEUMATIC AORTIC VALVE DISORDERS: ICD-10-CM

## 2018-01-13 DIAGNOSIS — I50.30 UNSPECIFIED DIASTOLIC (CONGESTIVE) HEART FAILURE: ICD-10-CM

## 2018-01-13 DIAGNOSIS — E11.9 TYPE 2 DIABETES MELLITUS WITHOUT COMPLICATIONS: ICD-10-CM

## 2018-01-13 DIAGNOSIS — G93.41 METABOLIC ENCEPHALOPATHY: ICD-10-CM

## 2018-01-13 DIAGNOSIS — I27.20 PULMONARY HYPERTENSION, UNSPECIFIED: ICD-10-CM

## 2018-01-22 ENCOUNTER — OUTPATIENT (OUTPATIENT)
Dept: OUTPATIENT SERVICES | Facility: HOSPITAL | Age: 78
LOS: 1 days | Discharge: HOME | End: 2018-01-22

## 2018-01-22 DIAGNOSIS — D64.9 ANEMIA, UNSPECIFIED: ICD-10-CM

## 2018-01-23 ENCOUNTER — OUTPATIENT (OUTPATIENT)
Dept: OUTPATIENT SERVICES | Facility: HOSPITAL | Age: 78
LOS: 1 days | Discharge: HOME | End: 2018-01-23

## 2018-01-24 DIAGNOSIS — R41.82 ALTERED MENTAL STATUS, UNSPECIFIED: ICD-10-CM

## 2018-01-24 DIAGNOSIS — N18.4 CHRONIC KIDNEY DISEASE, STAGE 4 (SEVERE): ICD-10-CM

## 2018-01-24 DIAGNOSIS — I50.33 ACUTE ON CHRONIC DIASTOLIC (CONGESTIVE) HEART FAILURE: ICD-10-CM

## 2018-01-24 DIAGNOSIS — M10.9 GOUT, UNSPECIFIED: ICD-10-CM

## 2018-01-24 DIAGNOSIS — K59.00 CONSTIPATION, UNSPECIFIED: ICD-10-CM

## 2018-01-24 DIAGNOSIS — J96.10 CHRONIC RESPIRATORY FAILURE, UNSPECIFIED WHETHER WITH HYPOXIA OR HYPERCAPNIA: ICD-10-CM

## 2018-01-24 DIAGNOSIS — Z79.01 LONG TERM (CURRENT) USE OF ANTICOAGULANTS: ICD-10-CM

## 2018-01-24 DIAGNOSIS — I48.91 UNSPECIFIED ATRIAL FIBRILLATION: ICD-10-CM

## 2018-01-24 DIAGNOSIS — Z91.81 HISTORY OF FALLING: ICD-10-CM

## 2018-01-24 DIAGNOSIS — L89.90 PRESSURE ULCER OF UNSPECIFIED SITE, UNSPECIFIED STAGE: ICD-10-CM

## 2018-01-24 DIAGNOSIS — I38 ENDOCARDITIS, VALVE UNSPECIFIED: ICD-10-CM

## 2018-01-24 DIAGNOSIS — I25.2 OLD MYOCARDIAL INFARCTION: ICD-10-CM

## 2018-01-24 DIAGNOSIS — I27.20 PULMONARY HYPERTENSION, UNSPECIFIED: ICD-10-CM

## 2018-01-24 DIAGNOSIS — E11.22 TYPE 2 DIABETES MELLITUS WITH DIABETIC CHRONIC KIDNEY DISEASE: ICD-10-CM

## 2018-01-24 DIAGNOSIS — J44.9 CHRONIC OBSTRUCTIVE PULMONARY DISEASE, UNSPECIFIED: ICD-10-CM

## 2018-01-24 DIAGNOSIS — Z99.81 DEPENDENCE ON SUPPLEMENTAL OXYGEN: ICD-10-CM

## 2018-01-24 DIAGNOSIS — R79.9 ABNORMAL FINDING OF BLOOD CHEMISTRY, UNSPECIFIED: ICD-10-CM

## 2018-01-24 DIAGNOSIS — K21.9 GASTRO-ESOPHAGEAL REFLUX DISEASE WITHOUT ESOPHAGITIS: ICD-10-CM

## 2018-01-24 DIAGNOSIS — N17.9 ACUTE KIDNEY FAILURE, UNSPECIFIED: ICD-10-CM

## 2018-01-24 DIAGNOSIS — E87.5 HYPERKALEMIA: ICD-10-CM

## 2018-01-24 DIAGNOSIS — D64.9 ANEMIA, UNSPECIFIED: ICD-10-CM

## 2018-01-24 DIAGNOSIS — I12.9 HYPERTENSIVE CHRONIC KIDNEY DISEASE WITH STAGE 1 THROUGH STAGE 4 CHRONIC KIDNEY DISEASE, OR UNSPECIFIED CHRONIC KIDNEY DISEASE: ICD-10-CM

## 2018-01-24 DIAGNOSIS — I25.10 ATHEROSCLEROTIC HEART DISEASE OF NATIVE CORONARY ARTERY WITHOUT ANGINA PECTORIS: ICD-10-CM

## 2018-01-24 DIAGNOSIS — F32.9 MAJOR DEPRESSIVE DISORDER, SINGLE EPISODE, UNSPECIFIED: ICD-10-CM

## 2018-01-24 DIAGNOSIS — H40.9 UNSPECIFIED GLAUCOMA: ICD-10-CM

## 2018-01-27 ENCOUNTER — OUTPATIENT (OUTPATIENT)
Dept: OUTPATIENT SERVICES | Facility: HOSPITAL | Age: 78
LOS: 1 days | Discharge: HOME | End: 2018-01-27

## 2018-01-27 DIAGNOSIS — R79.9 ABNORMAL FINDING OF BLOOD CHEMISTRY, UNSPECIFIED: ICD-10-CM

## 2018-02-04 DIAGNOSIS — L73.2 HIDRADENITIS SUPPURATIVA: ICD-10-CM

## 2018-02-04 DIAGNOSIS — I35.8 OTHER NONRHEUMATIC AORTIC VALVE DISORDERS: ICD-10-CM

## 2018-02-04 DIAGNOSIS — E11.9 TYPE 2 DIABETES MELLITUS WITHOUT COMPLICATIONS: ICD-10-CM

## 2018-02-04 DIAGNOSIS — I48.91 UNSPECIFIED ATRIAL FIBRILLATION: ICD-10-CM

## 2018-02-04 DIAGNOSIS — I27.20 PULMONARY HYPERTENSION, UNSPECIFIED: ICD-10-CM

## 2018-02-04 DIAGNOSIS — I50.30 UNSPECIFIED DIASTOLIC (CONGESTIVE) HEART FAILURE: ICD-10-CM

## 2018-02-04 DIAGNOSIS — E78.5 HYPERLIPIDEMIA, UNSPECIFIED: ICD-10-CM

## 2018-02-20 ENCOUNTER — LABORATORY RESULT (OUTPATIENT)
Age: 78
End: 2018-02-20

## 2018-02-20 ENCOUNTER — OUTPATIENT (OUTPATIENT)
Dept: OUTPATIENT SERVICES | Facility: HOSPITAL | Age: 78
LOS: 1 days | Discharge: HOME | End: 2018-02-20

## 2018-02-20 ENCOUNTER — APPOINTMENT (OUTPATIENT)
Dept: HEMATOLOGY ONCOLOGY | Facility: CLINIC | Age: 78
End: 2018-02-20

## 2018-02-20 VITALS
BODY MASS INDEX: 26.24 KG/M2 | SYSTOLIC BLOOD PRESSURE: 99 MMHG | WEIGHT: 139 LBS | HEART RATE: 86 BPM | TEMPERATURE: 97.6 F | HEIGHT: 61 IN | DIASTOLIC BLOOD PRESSURE: 56 MMHG

## 2018-02-20 DIAGNOSIS — D50.9 IRON DEFICIENCY ANEMIA, UNSPECIFIED: ICD-10-CM

## 2018-02-21 LAB
HCT VFR BLD CALC: 38.6 %
HGB BLD-MCNC: 12.1 G/DL
MCHC RBC-ENTMCNC: 31.3 G/DL
MCHC RBC-ENTMCNC: 33 PG
MCV RBC AUTO: 105.2 FL
PLATELET # BLD AUTO: 168 K/UL
PMV BLD: 11.4 FL
RBC # BLD: 3.67 M/UL
RBC # FLD: 14.7 %
WBC # FLD AUTO: 6.4 K/UL

## 2018-05-21 ENCOUNTER — OUTPATIENT (OUTPATIENT)
Dept: OUTPATIENT SERVICES | Facility: HOSPITAL | Age: 78
LOS: 1 days | Discharge: HOME | End: 2018-05-21

## 2018-05-21 DIAGNOSIS — I48.91 UNSPECIFIED ATRIAL FIBRILLATION: ICD-10-CM

## 2018-06-04 ENCOUNTER — OUTPATIENT (OUTPATIENT)
Dept: OUTPATIENT SERVICES | Facility: HOSPITAL | Age: 78
LOS: 1 days | Discharge: HOME | End: 2018-06-04

## 2018-06-04 DIAGNOSIS — Z79.899 OTHER LONG TERM (CURRENT) DRUG THERAPY: ICD-10-CM

## 2018-06-06 ENCOUNTER — APPOINTMENT (OUTPATIENT)
Dept: HEMATOLOGY ONCOLOGY | Facility: CLINIC | Age: 78
End: 2018-06-06

## 2018-06-11 ENCOUNTER — OUTPATIENT (OUTPATIENT)
Dept: OUTPATIENT SERVICES | Facility: HOSPITAL | Age: 78
LOS: 1 days | Discharge: HOME | End: 2018-06-11

## 2018-06-12 DIAGNOSIS — R79.9 ABNORMAL FINDING OF BLOOD CHEMISTRY, UNSPECIFIED: ICD-10-CM

## 2018-06-14 ENCOUNTER — APPOINTMENT (OUTPATIENT)
Dept: HEMATOLOGY ONCOLOGY | Facility: CLINIC | Age: 78
End: 2018-06-14

## 2018-06-14 VITALS
RESPIRATION RATE: 17 BRPM | HEIGHT: 61 IN | DIASTOLIC BLOOD PRESSURE: 68 MMHG | TEMPERATURE: 97.6 F | SYSTOLIC BLOOD PRESSURE: 115 MMHG | HEART RATE: 87 BPM

## 2018-06-15 ENCOUNTER — OUTPATIENT (OUTPATIENT)
Dept: OUTPATIENT SERVICES | Facility: HOSPITAL | Age: 78
LOS: 1 days | Discharge: HOME | End: 2018-06-15

## 2018-06-15 DIAGNOSIS — I50.9 HEART FAILURE, UNSPECIFIED: ICD-10-CM

## 2018-06-15 DIAGNOSIS — R06.00 DYSPNEA, UNSPECIFIED: ICD-10-CM

## 2018-06-20 ENCOUNTER — APPOINTMENT (OUTPATIENT)
Dept: HEMATOLOGY ONCOLOGY | Facility: CLINIC | Age: 78
End: 2018-06-20

## 2018-06-20 ENCOUNTER — LABORATORY RESULT (OUTPATIENT)
Age: 78
End: 2018-06-20

## 2018-06-20 ENCOUNTER — OUTPATIENT (OUTPATIENT)
Dept: OUTPATIENT SERVICES | Facility: HOSPITAL | Age: 78
LOS: 1 days | Discharge: HOME | End: 2018-06-20

## 2018-06-20 ENCOUNTER — EMERGENCY (EMERGENCY)
Facility: HOSPITAL | Age: 78
LOS: 0 days | Discharge: NURSING, REHAB OR LONG-TERM CARE | End: 2018-06-20
Attending: EMERGENCY MEDICINE | Admitting: EMERGENCY MEDICINE

## 2018-06-20 VITALS
OXYGEN SATURATION: 94 % | TEMPERATURE: 98 F | DIASTOLIC BLOOD PRESSURE: 63 MMHG | HEART RATE: 92 BPM | SYSTOLIC BLOOD PRESSURE: 137 MMHG | WEIGHT: 199.96 LBS | RESPIRATION RATE: 18 BRPM

## 2018-06-20 VITALS
DIASTOLIC BLOOD PRESSURE: 54 MMHG | HEART RATE: 95 BPM | RESPIRATION RATE: 17 BRPM | OXYGEN SATURATION: 98 % | SYSTOLIC BLOOD PRESSURE: 100 MMHG

## 2018-06-20 DIAGNOSIS — Z91.018 ALLERGY TO OTHER FOODS: ICD-10-CM

## 2018-06-20 DIAGNOSIS — K21.9 GASTRO-ESOPHAGEAL REFLUX DISEASE WITHOUT ESOPHAGITIS: ICD-10-CM

## 2018-06-20 DIAGNOSIS — D64.9 ANEMIA, UNSPECIFIED: ICD-10-CM

## 2018-06-20 DIAGNOSIS — N28.9 DISORDER OF KIDNEY AND URETER, UNSPECIFIED: ICD-10-CM

## 2018-06-20 DIAGNOSIS — R60.0 LOCALIZED EDEMA: ICD-10-CM

## 2018-06-20 DIAGNOSIS — J44.9 CHRONIC OBSTRUCTIVE PULMONARY DISEASE, UNSPECIFIED: ICD-10-CM

## 2018-06-20 DIAGNOSIS — Z79.899 OTHER LONG TERM (CURRENT) DRUG THERAPY: ICD-10-CM

## 2018-06-20 DIAGNOSIS — E11.9 TYPE 2 DIABETES MELLITUS WITHOUT COMPLICATIONS: ICD-10-CM

## 2018-06-20 DIAGNOSIS — I10 ESSENTIAL (PRIMARY) HYPERTENSION: ICD-10-CM

## 2018-06-20 DIAGNOSIS — M79.89 OTHER SPECIFIED SOFT TISSUE DISORDERS: ICD-10-CM

## 2018-06-20 DIAGNOSIS — I25.2 OLD MYOCARDIAL INFARCTION: ICD-10-CM

## 2018-06-20 DIAGNOSIS — F32.9 MAJOR DEPRESSIVE DISORDER, SINGLE EPISODE, UNSPECIFIED: ICD-10-CM

## 2018-06-20 DIAGNOSIS — D61.818 OTHER PANCYTOPENIA: ICD-10-CM

## 2018-06-20 DIAGNOSIS — I48.91 UNSPECIFIED ATRIAL FIBRILLATION: ICD-10-CM

## 2018-06-20 LAB
ALBUMIN SERPL ELPH-MCNC: 3 G/DL — LOW (ref 3.5–5.2)
ALP SERPL-CCNC: 72 U/L — SIGNIFICANT CHANGE UP (ref 30–115)
ALT FLD-CCNC: 10 U/L — SIGNIFICANT CHANGE UP (ref 0–41)
ANION GAP SERPL CALC-SCNC: 7 MMOL/L — SIGNIFICANT CHANGE UP (ref 7–14)
APTT BLD: 27.6 SEC — SIGNIFICANT CHANGE UP (ref 27–39.2)
AST SERPL-CCNC: 73 U/L — HIGH (ref 0–41)
BASE EXCESS BLDV CALC-SCNC: 12.5 MMOL/L — HIGH (ref -2–2)
BASOPHILS # BLD AUTO: 0.01 K/UL — SIGNIFICANT CHANGE UP (ref 0–0.2)
BASOPHILS NFR BLD AUTO: 0.2 % — SIGNIFICANT CHANGE UP (ref 0–1)
BILIRUB SERPL-MCNC: 0.5 MG/DL — SIGNIFICANT CHANGE UP (ref 0.2–1.2)
BUN SERPL-MCNC: 106 MG/DL — CRITICAL HIGH (ref 10–20)
CA-I SERPL-SCNC: 1.17 MMOL/L — SIGNIFICANT CHANGE UP (ref 1.12–1.3)
CALCIUM SERPL-MCNC: 9.3 MG/DL — SIGNIFICANT CHANGE UP (ref 8.5–10.1)
CHLORIDE SERPL-SCNC: 89 MMOL/L — LOW (ref 98–110)
CK SERPL-CCNC: 123 U/L — SIGNIFICANT CHANGE UP (ref 0–225)
CO2 SERPL-SCNC: 40 MMOL/L — HIGH (ref 17–32)
CREAT SERPL-MCNC: 1.8 MG/DL — HIGH (ref 0.7–1.5)
EOSINOPHIL # BLD AUTO: 0.2 K/UL — SIGNIFICANT CHANGE UP (ref 0–0.7)
EOSINOPHIL NFR BLD AUTO: 4 % — SIGNIFICANT CHANGE UP (ref 0–8)
GAS PNL BLDV: 134 MMOL/L — LOW (ref 136–145)
GAS PNL BLDV: SIGNIFICANT CHANGE UP
GLUCOSE SERPL-MCNC: 99 MG/DL — SIGNIFICANT CHANGE UP (ref 70–99)
HCO3 BLDV-SCNC: 41 MMOL/L — HIGH (ref 22–29)
HCT VFR BLD CALC: 30.3 % — LOW (ref 37–47)
HCT VFR BLD CALC: 30.6 %
HCT VFR BLDA CALC: 49.4 % — HIGH (ref 34–44)
HGB BLD CALC-MCNC: 16.1 G/DL — SIGNIFICANT CHANGE UP (ref 14–18)
HGB BLD-MCNC: 9.4 G/DL
HGB BLD-MCNC: 9.6 G/DL — LOW (ref 12–16)
HOROWITZ INDEX BLDV+IHG-RTO: 21 — SIGNIFICANT CHANGE UP
IMM GRANULOCYTES NFR BLD AUTO: 0.4 % — HIGH (ref 0.1–0.3)
INR BLD: 1.62 RATIO — HIGH (ref 0.65–1.3)
IRON SATN MFR SERPL: 30 %
IRON SERPL-MCNC: 67 UG/DL
LACTATE BLDV-MCNC: 0.5 MMOL/L — SIGNIFICANT CHANGE UP (ref 0.5–1.6)
LYMPHOCYTES # BLD AUTO: 0.95 K/UL — LOW (ref 1.2–3.4)
LYMPHOCYTES # BLD AUTO: 19.2 % — LOW (ref 20.5–51.1)
MAGNESIUM SERPL-MCNC: 2.6 MG/DL — HIGH (ref 1.8–2.4)
MCHC RBC-ENTMCNC: 30.7 G/DL
MCHC RBC-ENTMCNC: 31.7 G/DL — LOW (ref 32–37)
MCHC RBC-ENTMCNC: 33.2 PG
MCHC RBC-ENTMCNC: 34 PG — HIGH (ref 27–31)
MCV RBC AUTO: 107.4 FL — HIGH (ref 81–99)
MCV RBC AUTO: 108.1 FL
MONOCYTES # BLD AUTO: 0.74 K/UL — HIGH (ref 0.1–0.6)
MONOCYTES NFR BLD AUTO: 15 % — HIGH (ref 1.7–9.3)
NEUTROPHILS # BLD AUTO: 3.02 K/UL — SIGNIFICANT CHANGE UP (ref 1.4–6.5)
NEUTROPHILS NFR BLD AUTO: 61.2 % — SIGNIFICANT CHANGE UP (ref 42.2–75.2)
NRBC # BLD: 0 /100 WBCS — SIGNIFICANT CHANGE UP (ref 0–0)
NT-PROBNP SERPL-SCNC: 9445 PG/ML — HIGH (ref 0–300)
PCO2 BLDV: 67 MMHG — HIGH (ref 41–51)
PH BLDV: 7.4 — SIGNIFICANT CHANGE UP (ref 7.26–7.43)
PLATELET # BLD AUTO: 154 K/UL
PLATELET # BLD AUTO: 165 K/UL — SIGNIFICANT CHANGE UP (ref 130–400)
PMV BLD: 10.2 FL
PO2 BLDV: 167 MMHG — HIGH (ref 20–40)
POTASSIUM BLDV-SCNC: 3.7 MMOL/L — SIGNIFICANT CHANGE UP (ref 3.3–5.6)
POTASSIUM SERPL-MCNC: 6.7 MMOL/L — CRITICAL HIGH (ref 3.5–5)
POTASSIUM SERPL-SCNC: 6.7 MMOL/L — CRITICAL HIGH (ref 3.5–5)
PROT SERPL-MCNC: 7.2 G/DL — SIGNIFICANT CHANGE UP (ref 6–8)
PROTHROM AB SERPL-ACNC: 17.7 SEC — HIGH (ref 9.95–12.87)
RBC # BLD: 2.82 M/UL — LOW (ref 4.2–5.4)
RBC # BLD: 2.83 M/UL
RBC # FLD: 15.4 %
RBC # FLD: 15.4 % — HIGH (ref 11.5–14.5)
SAO2 % BLDV: 99 % — SIGNIFICANT CHANGE UP
SODIUM SERPL-SCNC: 136 MMOL/L — SIGNIFICANT CHANGE UP (ref 135–146)
TIBC SERPL-MCNC: 222 UG/DL
TROPONIN T SERPL-MCNC: 0.02 NG/ML — HIGH
UIBC SERPL-MCNC: 155 UG/DL
WBC # BLD: 4.94 K/UL — SIGNIFICANT CHANGE UP (ref 4.8–10.8)
WBC # FLD AUTO: 4.09 K/UL
WBC # FLD AUTO: 4.94 K/UL — SIGNIFICANT CHANGE UP (ref 4.8–10.8)

## 2018-06-20 RX ORDER — MAGNESIUM OXIDE 400 MG ORAL TABLET 241.3 MG
1 TABLET ORAL
Qty: 0 | Refills: 0 | COMMUNITY

## 2018-06-20 RX ORDER — ALLOPURINOL 300 MG
1 TABLET ORAL
Qty: 0 | Refills: 0 | COMMUNITY

## 2018-06-20 RX ORDER — BRINZOLAMIDE 10 MG/ML
1 SUSPENSION/ DROPS OPHTHALMIC
Qty: 0 | Refills: 0 | COMMUNITY

## 2018-06-20 RX ORDER — SIMETHICONE 80 MG/1
1 TABLET, CHEWABLE ORAL
Qty: 0 | Refills: 0 | COMMUNITY

## 2018-06-20 RX ORDER — METOPROLOL TARTRATE 50 MG
1 TABLET ORAL
Qty: 0 | Refills: 0 | COMMUNITY

## 2018-06-20 RX ORDER — LATANOPROST 0.05 MG/ML
1 SOLUTION/ DROPS OPHTHALMIC; TOPICAL
Qty: 0 | Refills: 0 | COMMUNITY

## 2018-06-20 RX ORDER — DOCUSATE SODIUM 100 MG
1 CAPSULE ORAL
Qty: 0 | Refills: 0 | COMMUNITY

## 2018-06-20 RX ORDER — FERROUS SULFATE 325(65) MG
1 TABLET ORAL
Qty: 0 | Refills: 0 | COMMUNITY

## 2018-06-20 RX ORDER — TIMOLOL 0.5 %
1 DROPS OPHTHALMIC (EYE)
Qty: 0 | Refills: 0 | COMMUNITY

## 2018-06-20 RX ORDER — FUROSEMIDE 40 MG
1 TABLET ORAL
Qty: 0 | Refills: 0 | COMMUNITY

## 2018-06-20 RX ORDER — APIXABAN 2.5 MG/1
1 TABLET, FILM COATED ORAL
Qty: 0 | Refills: 0 | COMMUNITY

## 2018-06-20 RX ORDER — BUMETANIDE 0.25 MG/ML
1 INJECTION INTRAMUSCULAR; INTRAVENOUS
Qty: 0 | Refills: 0 | COMMUNITY

## 2018-06-20 RX ORDER — ASCORBIC ACID 60 MG
1 TABLET,CHEWABLE ORAL
Qty: 0 | Refills: 0 | COMMUNITY

## 2018-06-20 RX ORDER — ATORVASTATIN CALCIUM 80 MG/1
1 TABLET, FILM COATED ORAL
Qty: 0 | Refills: 0 | COMMUNITY

## 2018-06-20 RX ORDER — IPRATROPIUM/ALBUTEROL SULFATE 18-103MCG
0 AEROSOL WITH ADAPTER (GRAM) INHALATION
Qty: 0 | Refills: 0 | COMMUNITY

## 2018-06-20 RX ORDER — DIGOXIN 250 MCG
1 TABLET ORAL
Qty: 0 | Refills: 0 | COMMUNITY

## 2018-06-20 RX ORDER — ERYTHROPOIETIN 10000 [IU]/ML
0 INJECTION, SOLUTION INTRAVENOUS; SUBCUTANEOUS
Qty: 0 | Refills: 0 | COMMUNITY

## 2018-06-20 RX ORDER — ZINC SULFATE TAB 220 MG (50 MG ZINC EQUIVALENT) 220 (50 ZN) MG
1 TAB ORAL
Qty: 0 | Refills: 0 | COMMUNITY

## 2018-06-20 RX ORDER — SENNA PLUS 8.6 MG/1
1 TABLET ORAL
Qty: 0 | Refills: 0 | COMMUNITY

## 2018-06-20 NOTE — ED ADULT NURSE NOTE - PMH
Anemia    Atrial fibrillation    Chronic obstructive pulmonary disease    Depression    Diabetes mellitus, type 2    Endocarditis    Gastroesophageal reflux disease    Glaucoma    Hypertension    Kidney failure    MI, old    Sepsis

## 2018-06-20 NOTE — REVIEW OF SYSTEMS
[Fatigue] : fatigue [Lower Ext Edema] : lower extremity edema [SOB on Exertion] : shortness of breath during exertion [Negative] : Heme/Lymph [Recent Change In Weight] : ~T no recent weight change [Chest Pain] : no chest pain [Skin Wound] : no skin wound

## 2018-06-20 NOTE — PHYSICAL EXAM
[Completely disabled. Cannot carry on any self care. Totally confined to bed or chair] : Status 4- Completely disabled. Cannot carry on any self care. Totally confined to bed or chair [Normal] : grossly intact [de-identified] : In wheelchair on oxygen [de-identified] : She has a systolic murmur  [de-identified] : Has edema in lower extremities +3.  [de-identified] : Did not ambulate

## 2018-06-20 NOTE — ED PROVIDER NOTE - CARE PLAN
Principal Discharge DX:	Pedal edema  Secondary Diagnosis:	Renal insufficiency  Secondary Diagnosis:	Anemia

## 2018-06-20 NOTE — ED PROVIDER NOTE - NS ED ROS FT
Review of Systems    Constitutional: (-) fever/ chills   Eyes/ENT: (-) blurry vision, (-) sore throat (-) ear pain  Cardiovascular: (-) chest pain, (-) syncope (-) palpitations  Respiratory: (-) cough, (-) shortness of breath  Gastrointestinal: (-) vomiting, (-) diarrhea (-) abdominal pain  Musculoskeletal: (-) neck pain, (-) back pain, (-) joint pain (+) pedal edema   Integumentary: (-) rash, (+) swelling  Neurological: (-) headache, (-) altered mental status  Psychiatric: (-) hallucinations or depression   Allergic/Immunologic: (-) pruritus

## 2018-06-20 NOTE — ASSESSMENT
[FreeTextEntry1] : Impression:\par \par Pancytopenia with macrocytosis, her thrombocytopenia resolved, the decrease WBC is intermittent and resolved and she is no longer anemia\par 1. The anemia was  most likely due to chronic disease including CKD \par -MDS is a possibility but given her overall functional status will hold of bone marrow  sine it would not  at hit time \par \par 2. She has mild thrombocytopenia again it maybe sequestration or MDS would monitor with CBCs\par -can c/w apixiban, GFR and platelets are adequter \par \par Plan:\par -since HgB is now under 10 will c/w  Procrit 20,000 units weekly  will hold if Hgb greater than 11 m/dl will restart if less than 10. This is to be done in facility. CBC every 2 weeks\par -will recheck CBC , iron studies  b12 foalte to make sure has adequate stores will call amadeo richardson if needs attention.\par -RTC in September I spoke with Amadeo quinteros

## 2018-06-20 NOTE — ED PROVIDER NOTE - MEDICAL DECISION MAKING DETAILS
I personally evaluated the patient. I reviewed the Resident’s or Physician Assistant’s note (as assigned above), and agree with the findings and plan except as documented in my note. Pt sent in from NH for b/l LE swelling and decreased urine output for 2 days. Pt denies any fever, chills, nausea, vomiting, CP, SOB. On exam: NCAT. PERRLA, EOMI. OP clear. Lungs CTAB. RRR, S1S2 noted. Radial pulses 2+ and equal, pedal pulses 2+ and equal, (+) b/l pedal edema, Abdomen soft, NT/ND, no rebound or guarding. FROM x4 extremities. No focal neuro deficits. I personally evaluated the patient. I reviewed the Resident’s or Physician Assistant’s note (as assigned above), and agree with the findings and plan except as documented in my note. Pt sent in from NH for b/l LE swelling and decreased urine output for 2 days. Pt denies any fever, chills, nausea, vomiting, CP, SOB. On exam: NCAT. PERRLA, EOMI. OP clear. Lungs CTAB. RRR, S1S2 noted. Radial pulses 2+ and equal, pedal pulses 2+ and equal, (+) b/l pedal edema, Abdomen soft, NT/ND, no rebound or guarding. FROM x4 extremities. No focal neuro deficits.  I will obtain labs and place oro catheter.  will continue to monitor at this time

## 2018-06-20 NOTE — ED ADULT TRIAGE NOTE - CHIEF COMPLAINT QUOTE
VERONICA Carrillo states "Resident sent from, [facility] for bilateral lower extremity swelling and the family is requesting a oro".

## 2018-06-20 NOTE — ED ADULT NURSE REASSESSMENT NOTE - NS ED NURSE REASSESS COMMENT FT1
Pt D/C via stretcher [facility]; IV line removed; transport w/ O2 5L via NC. Indwelling urinary catheter in place; bag emptied 150ml. V/S reassessed. Pt stable for transport.

## 2018-06-20 NOTE — HISTORY OF PRESENT ILLNESS
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 Pierce.  She was seen by GI and by Heme once. She was started on Procrit and Iron Oral  and was to have outpatient endoscopy EGD. Bone Marrow was recommended as outpatient\par \par She is in [facility].\par Her anemia work up was as bellow. Trend of her blood work showed intermittent pancytopenia, but most recent showed pancytopenia. Her b12 and folate were WNL and she is on folate and IM.\par \par Her macrocytosis started around November.\par \par She has SOB. She is in a wheelchair with oxygen. I spoke to her son Cheng over the phone [de-identified] : 6/5/17\par She presents for follow up today. Her CBC is stable. She is in wheelchair. With Aid. States walkd around with walker but just a bit. Has oxygen if needed. Flow cytometry was negative. \par \par 12/4/17\par She is here for follow up. She is on Oxygen and in a wheelchair. She has been on Procrit 20,000 units weekly with HgB ranging in 10-11 gm/dl. Her Edema is in LE is better. She had injury to her left leg and has a mild hematoma. \par \par February 20, 2018\par She's here for followup with her aid. She is in a wheelchair and is on oxygen but states that she no longer uses the wheelchair unless she leads the facility. Her edema has improved her shortness of breath has also improved.\par \par 6/14/18\par She is here for follow up. She is more fatigued. She cant walk her edema is  back. Blood work from facility 5/21/18 shows HgB of 8.9, WBC 5.02, Plts 118 Cr is 1.5\par \par 6/20/18\par She is here for follow up. This appointment was made accidently in February unclear why it was not canceled since she saw me last week. She was stated on Procrti 20,000 untis weekly . I called Amadeo Sharp they told me no new events they were just bringing her back for followup as was scheduled. I told them her next appointment is  in Sept 13 10:45\par \par She feels the same. On oxygen, severe LE edema. Cant walk no bleeding.

## 2018-06-20 NOTE — RESULTS/DATA
[FreeTextEntry1] : Check 5 on Selected Components for HEMOGRAM \par     TEST: HEMOGRAM\par  \par     Collected Date & Time: 2017 00:00\par  \par        \par     Result Name Results Units Reference Range \par      WHITE BLOOD CELL COUNT   3.86 L   TH/MM3   4.8-10.8 \par      RED BLOOD CELL COUNT   2.87 L   MIL/MM3   4.2-5.4 \par      HEMOGLOBIN   9.1 L   g/dL   12.0-16.0 \par      HEMATOCRIT   30.4 L   %   37-47 \par      MEAN CORPUSCULAR HEMOGLOB   31.7 H   PG   27-31 \par      MEAN CORPUS HGB CONC   29.9 L   G/DL   32-37 \par      RBC DISTRIBUTION WIDTH   14.9 H   %   11.5-14.5 \par      PLATELET COUNT   117 L   TH/MM3   130-400 \par      LYMPHOCYTE PER CENT   30.8   %   20.5-51.1 \par      MONO %   9.6 H   %   1.7-9.3 \par      GRAN %   53.9   %   42.2-75.2 \par      IMM GRAN %   0 L   %   0.1-0.3 \par      EOS %   5.4   %   0-8 \par      BASO %   0.3   %   0-2 \par      LYMPHOCYTE NUMBER   1.19 L   TH/MM3   1.2-3.4 \par      MONO NUMBER   0.37   TH/MM3   0.11-0.59 \par      GRAN NUMBER   2.08   TH/MM3   1.4-6.5 \par      IMM GRAN NUMBER   0 L   TH/MM3   0.01-0.03 \par      EOS NUMBER   0.21   TH/MM3   0-0.7 \par      BASO NUMBER   0.01   TH/MM3   0-0.2 \par      MEAN CORPUSCULAR VOLUME   105.9 H   fL   81-99 \par      MEAN PLATELET VOL   12.1 H   fL   7.4-10.4 \par                     Other Clinicians who have viewed this Result in Portal or Rounding \par \par \par --------------------------------------------------------------------------------\par      Check 5 on Selected Components for COMPREHENSIVE METABOLIC PANEL \par     TEST: COMPREHENSIVE METABOLIC PANEL\par  \par     Collected Date & Time: 2017 00:00\par  \par        \par     Result Name Results Units Reference Range \par      GLUCOSE   77   mg/dL    \par     TEST PERFORMED AT [facility] [address] [facility] [place] [address]\par  \par      BLOOD UREA NITROGEN   38 H   mg/dL   10-20 \par      BUN CREAT RATIO   35.5 H   %   10-20 \par      CREATININE   1.07   mg/dL   0.7-1.5 \par     TEST PERFORMED AT [facility] [address] [address] [place] [address]\par  \par      GFR   50       \par      POTASSIUM   4.5   mmol/L   3.5-5.0 \par     TEST PERFORMED [facility] [place] [address] [facility] [place] [address]\par  \par      CALCIUM   9.5   mg/dL   8.5-10.1 \par     TESTS PERFORMED [facility] [place] [address] [facility] [address]\par  \par      TOTAL PROTEIN   6.2   g/dL   6.0-8.0 \par     TEST PERFORMED AT [place] [address] [facility] [address]\par  \par      ALBUMIN   3.2   g/dL   3.0-5.5 \par     TEST PERFORMED [facility] [place] [address] [address] [place] [address]\par  \par      TOTAL BILIRUBIN   0.6   mg/dL   0.2-1.2 \par      ALKALINE PHOSPHATASE   47   IU/L    \par      ASPARTATE AMINOTRANSFERAS   15   IU/L   0-41 \par      ALANINE AMINOTRANSFERASE   7   IU/L   0-45 \par     TESTS PERFORMED AT [place] [address] [address] [place] [address]\par  \par      A/G RATIO CALC   1.07 L      1.7-3.5 \par      ANION GAP   5.0 L   mEq/L   7-14 \par      SODIUM   139   mEq/L   135-146 \par      CHLORIDE   104   mEq/L    \par      CARBON DIOXIDE   30   mEq/L   17-32 \par     TESTS PERFORMED AT [place] [address] [facility] [place] [address]\par  \par                     Other Clinicians who have viewed this Result in Portal or Rounding \par --------------------------------------------------------------------------------\par      Check 5 on Selected Components for C REACTIVE PROTEIN \par     TEST: C REACTIVE PROTEIN\par  \par     Collected Date & Time: 2017 06:20\par  \par        \par     Result Name Results Units Reference Range \par      C REACTIVE PROTEIN   0.26   mg/dL   0.00-0.40 \par     Performed at [facility].\par [address] [place]  [address]\par Medical Director: Dr. Yasir Garcia MD\par  \par  Collected Date & Time: 2017 10:33\par  \par        \par     Result Name Results Units Reference Range \par      BELINDA:W PROBE PLACMT-GLOBAL   SEE TEXT       \par     [facility]\par [address] [address]\par Transesophageal Echocardiogram\par 2D, Doppler, and Color Doppler\par GIOVANY PANIAGUA   [id number]\par Account number: [id number]\par : 1940\par Age: 76 years\par Gender: Female\par Study date: 2017\par Height:\par Weight:\par BSA:\par Allergies: NKA\par Performing Physician:  Cosme Richardson MD\par Sonographer:  LEONORA Sellers\par Referring MD:  RADHA Rogers MD\par Summary\par Clinical question: f/u ENDOCARDITIS,\par Left ventricle: Systolic function was normal. Ejection fraction was estimated\par in the range of 55 % to 65 %. There were no regional wall motion\par abnormalities.\par \par  \par  Check 5 on Selected Components for HAPTOGLOBIN \par     TEST: HAPTOGLOBIN\par  \par     Collected Date & Time: 2016 06:47\par  \par        \par     Result Name Results Units Reference Range \par      HAPTOGLOBIN   210 H   mg/dL    \par     Performed at [facility].\par [address] [place]  [address]\par Medical Director: Dr. Yasir Garcia MD\par  \par                     Other Clinicians who have viewed this Result in Portal or Rounding \par --------------------------------------------------------------------------------\par      Check 5 on Selected Components for LACTATE DEHYDROGENASE \par     TEST: LACTATE DEHYDROGENASE\par  \par     Collected Date & Time: 2016 06:47\par  \par        \par     Result Name Results Units Reference Range \par      LACTATE DEHYDROGENASE   134   IU/L    \par     TEST PERFORMED AT [facility] [place] [address]\par  \par                     Other Clinicians who have viewed this Result in Portal or Rounding \par --------------------------------------------------------------------------------\par      Check 5 on Selected Components for RETICULOCYTE PANEL \par     TEST: RETICULOCYTE PANEL\par  \par     Collected Date & Time: 2016 04:30\par  \par        \par     Result Name Results Units Reference Range \par      RETICULOCYTE COUNT   1.54 H   %   0.5-1.5 \par     TEST PERFORMED AT [facility]\[address] [place] [address]\par  \par      IMMATURE RETIC FRACTIONS   9.4   %   3.0-15.9 \par      RETIC HEMOGLOBIN EQUIVALENT   23.50 L   PG   24.1-35.8 \par     TESTS PERFORMED AT [facility]\par [address] [place] [address]\par  \par                     Other Clinicians who have viewed this Result in Portal or Rounding \par --------------------------------------------------------------------------------\par      Check 5 on Selected Components for KAPPA LAMBDA LIGHT CH FREE \par     TEST: KAPPA LAMBDA LIGHT CH FREE\par  \par     Collected Date & Time: 2016 04:30\par  \par        \par     Result Name Results Units Reference Range \par      FREE KAPPA/LAMBDA RATIO   1.37      0.26-1.65 \par     Free kappa/lambda ratio in serum of normal individuals is\par 0.26-1.65. Excess production of free kappa or lambda light\par chains alters the ratio. Ratios outside the normal range\par are attributed to the presence of monoclonal free light\par chains. Monoclonal free light chains are found in the serum\par of patients with multiple myeloma, Waldenstrom's\par macroglobulinemia, mu-heavy chain disease, primary\par amyloidosis, light chain deposition disease, monoclonal\par gammopathy of undetermined significance, and\par lymphoproliferative disorders. Measurement of free light\par chain concentration in serum is useful for diagnosis,\par prognosis, monitoring disease activity and following\par response to therapy of these disorders.\par  \par      FREE KAPPA SERUM   126.7 H   mg/L   3.3-19.4 \par      FREE LAMBDA SERUM   92.6 H   mg/L   5.7-26.3 \par                     Other Clinicians who have viewed this Result in Portal or Rounding \par --------------------------------------------------------------------------------\par      Check 5 on Selected Components for IMMUNOTYPING ELECTRO SERUM \par     TEST: IMMUNOTYPING ELECTRO SERUM\par  \par     Collected Date & Time: 2016 04:30\par  \par        \par     Result Name Results Units Reference Range \par      IMMUNOTYPING ELECTRO SERUM   SEE NOTE      () \par     No monoclonal proteins detected.\par  \par                     Other Clinicians who have viewed this Result in Portal or Rounding \par --------------------------------------------------------------------------------\par      Check 5 on Selected Components for RETIC ROUTINE \par     TEST: RETIC ROUTINE\par  \par     Collected Date & Time: 2016 04:30\par  \par        \par     Result Name Results Units Reference Range \par      RETICULOCYTE COUNT   1.54 H   %   0.5-1.5 \par     TEST PERFORMED AT [facility]\par [address] [place] [address]\par  \par                     Other Clinicians who have viewed this Result in Portal or Rounding \par --------------------------------------------------------------------------------\par      Check 5 on Selected Components for SERUM PROTEIN ELECTROPHORESIS \par     TEST: SERUM PROTEIN ELECTROPHORESIS\par  \par     Collected Date & Time: 2016 04:30\par  \par        \par     Result Name Results Units Reference Range \par      TOTAL PROTEIN   6.7   g/dL   6.0-8.3 \par     16 0444:\par TOTAL PROTEIN previously reported as: 6.7  g/dL\par Performed at [facility].\par [address] [place]  [address]\par Medical Director: Dr. Yasir Garcia MD\par  \par      ALBUMIN FRACT   2.5 L   g/dL   3.6-5.5 \par      ALPHA-1-FRACTION   0.5 H   g/dL   0.1-0.4 \par      ALPHA 2 FRACTION   0.8   g/dL   0.5-1.0 \par      BETA FRACTION   1.0   g/dL   0.5-1.0 \par      GAMMA FRACTION   1.9 H   g/dL   0.6-1.6 \par      %ALBUMIN FOR SPE   37.6   %   () \par      PROTEIN ELECTRO W/TP   6.7   g/dL   6.0-8.3 \par     16 1122:\par PROTEIN ELECTRO previously reported as: 6.7\par g/dL\par Performed at [facility].\par [address] [place]  [address]\par Medical Director: Dr. Yasir Garcia MD\par  \par      SERUM PROTEIN ELEC INTERP   See Text      () \par     Polyclonal Gammopathy\par Performed at [facility].\par [address] [place]  [address]\par Medical Director: Dr. Yasir Garcia MD\par  \par      %ALPHA 1 GLOBULIN FRACTION   7.6   %   () \par      %ALPHA2 GLOBULIN FRACTION   11.7   %   () \par      %BETA GLOBULIN FRACTION   14.2   %   () \par      %GAMMA GLOBULIN FRACTION   28.9   %   () \par      ALBUMIN/GLOBULIN RATIO   0.6   ZZ   () \par                     Other Clinicians who have viewed this Result in Portal or Rounding \par \par    TEST: IRON SERUM\par  \par     Collected Date & Time: 2016 11:00\par  \par        \par     Result Name Results Units Reference Range \par      IRON SERUM   15 L   ug/dL    \par     TEST PERFORMED AT [facility]\par [address] [place] [address]\par  \par                     Other Clinicians who have viewed this Result in Portal or Rounding \par --------------------------------------------------------------------------------\par      Check 5 on Selected Components for FERRITIN \par     TEST: FERRITIN\par  \par     Collected Date & Time: 2016 11:00\par  \par        \par     Result Name Results Units Reference Range \par      FERRITIN   135   ng/mL   15.0-150.0 \par     Performed at [facility].\par [address] [place]  [address]\par Medical Director: Dr. Yasir Garcia MD\par  \par                     Other Clinicians who have viewed this Result in Portal or Rounding \par --------------------------------------------------------------------------------\par      Check 5 on Selected Components for VIT B12 UNSAT BIND CAP \par     TEST: VIT B12 UNSAT BIND CAP\par  \par     Collected Date & Time: 2016 22:22\par  \par        \par     Result Name Results Units Reference Range \par      VIT B12 UNSAT BIND CAP   1380 H   pg/mL   650-1340 \par     This test was performed at:\par [facility]\par [address] [email or web address]\par [place], [place] \par  \par                     Other Clinicians who have viewed this Result in Portal or Rounding \par --------------------------------------------------------------------------------\par      Check 5 on Selected Components for FOLATE \par     TEST: FOLATE\par  \par     Collected Date & Time: 2016 22:22\par  \par        \par     Result Name Results Units Reference Range \par      FOLATE   10.5   ng/mL   4.8-24.2 \par     Performed at [facility]par [address] [place]  [address]\par Medical Director: Dr. Yasir Garcia MD\par  \par

## 2018-06-20 NOTE — ED PROVIDER NOTE - OBJECTIVE STATEMENT
78 y/o female hx of NIDDM, a.fib, anemia, COPD, brought in from nursing home for b/l leg edema, decreased urinary output. patient on home O2. patient sent in for oro insertion. patient started on diuretics lasix 80 mg BID, zaroxlyn 5 mg . patient denies any SOB, fever,cough, abdominal pain

## 2018-06-20 NOTE — ED PROVIDER NOTE - ATTENDING CONTRIBUTION TO CARE
I personally evaluated the patient. I reviewed the Resident’s or Physician Assistant’s note (as assigned above), and agree with the findings and plan except as documented in my note. Pt sent in from NH for b/l LE swelling and decreased urine output for 2 days. Pt denies any fever, chills, nausea, vomiting, CP, SOB. On exam: NCAT. PERRLA, EOMI. OP clear. Lungs CTAB. RRR, S1S2 noted. Radial pulses 2+ and equal, pedal pulses 2+ and equal, (+) b/l pedal edema, Abdomen soft, NT/ND, no rebound or guarding. FROM x4 extremities. No focal neuro deficits.  I will obtain labs and place oro catheter.  will continue to monitor at this time

## 2018-06-21 LAB
FERRITIN SERPL-MCNC: 199 NG/ML
FOLATE SERPL-MCNC: >20 NG/ML
VIT B12 SERPL-MCNC: 672 PG/ML

## 2018-06-22 ENCOUNTER — OUTPATIENT (OUTPATIENT)
Dept: OUTPATIENT SERVICES | Facility: HOSPITAL | Age: 78
LOS: 1 days | Discharge: HOME | End: 2018-06-22

## 2018-06-22 DIAGNOSIS — R79.9 ABNORMAL FINDING OF BLOOD CHEMISTRY, UNSPECIFIED: ICD-10-CM

## 2018-06-23 ENCOUNTER — INPATIENT (INPATIENT)
Facility: HOSPITAL | Age: 78
LOS: 2 days | End: 2018-06-26
Attending: INTERNAL MEDICINE | Admitting: INTERNAL MEDICINE
Payer: MEDICARE

## 2018-06-23 ENCOUNTER — OUTPATIENT (OUTPATIENT)
Dept: OUTPATIENT SERVICES | Facility: HOSPITAL | Age: 78
LOS: 1 days | Discharge: HOME | End: 2018-06-23

## 2018-06-23 VITALS
HEART RATE: 104 BPM | SYSTOLIC BLOOD PRESSURE: 97 MMHG | WEIGHT: 173.06 LBS | OXYGEN SATURATION: 80 % | RESPIRATION RATE: 22 BRPM | TEMPERATURE: 97 F | DIASTOLIC BLOOD PRESSURE: 36 MMHG

## 2018-06-23 DIAGNOSIS — D50.9 IRON DEFICIENCY ANEMIA, UNSPECIFIED: ICD-10-CM

## 2018-06-23 DIAGNOSIS — R53.1 WEAKNESS: ICD-10-CM

## 2018-06-23 LAB
ALBUMIN SERPL ELPH-MCNC: 3.3 G/DL — LOW (ref 3.5–5.2)
ALP SERPL-CCNC: 75 U/L — SIGNIFICANT CHANGE UP (ref 30–115)
ALT FLD-CCNC: 8 U/L — SIGNIFICANT CHANGE UP (ref 0–41)
ANION GAP SERPL CALC-SCNC: 13 MMOL/L — SIGNIFICANT CHANGE UP (ref 7–14)
APPEARANCE UR: (no result)
APTT BLD: 33.4 SEC — SIGNIFICANT CHANGE UP (ref 27–39.2)
AST SERPL-CCNC: 26 U/L — SIGNIFICANT CHANGE UP (ref 0–41)
BASOPHILS # BLD AUTO: 0 K/UL — SIGNIFICANT CHANGE UP (ref 0–0.2)
BASOPHILS NFR BLD AUTO: 0 % — SIGNIFICANT CHANGE UP (ref 0–1)
BILIRUB SERPL-MCNC: 0.7 MG/DL — SIGNIFICANT CHANGE UP (ref 0.2–1.2)
BILIRUB UR-MCNC: NEGATIVE — SIGNIFICANT CHANGE UP
BUN SERPL-MCNC: 116 MG/DL — CRITICAL HIGH (ref 10–20)
CALCIUM SERPL-MCNC: 9.1 MG/DL — SIGNIFICANT CHANGE UP (ref 8.5–10.1)
CHLORIDE SERPL-SCNC: 89 MMOL/L — LOW (ref 98–110)
CK MB CFR SERPL CALC: 1.6 NG/ML — SIGNIFICANT CHANGE UP (ref 0.6–6.3)
CK SERPL-CCNC: 36 U/L — SIGNIFICANT CHANGE UP (ref 0–225)
CO2 SERPL-SCNC: 39 MMOL/L — HIGH (ref 17–32)
COLOR SPEC: YELLOW — SIGNIFICANT CHANGE UP
CREAT SERPL-MCNC: 2 MG/DL — HIGH (ref 0.7–1.5)
DIFF PNL FLD: (no result)
DIGOXIN SERPL-MCNC: 0.3 NG/ML — LOW (ref 0.8–2)
EOSINOPHIL # BLD AUTO: 0.16 K/UL — SIGNIFICANT CHANGE UP (ref 0–0.7)
EOSINOPHIL NFR BLD AUTO: 0.8 % — SIGNIFICANT CHANGE UP (ref 0–8)
GAS PNL BLDV: SIGNIFICANT CHANGE UP
GLUCOSE SERPL-MCNC: 122 MG/DL — HIGH (ref 70–99)
GLUCOSE UR QL: NEGATIVE MG/DL — SIGNIFICANT CHANGE UP
HCT VFR BLD CALC: 33.1 % — LOW (ref 37–47)
HGB BLD-MCNC: 10.4 G/DL — LOW (ref 12–16)
INR BLD: 2.33 RATIO — HIGH (ref 0.65–1.3)
KETONES UR-MCNC: NEGATIVE — SIGNIFICANT CHANGE UP
LEUKOCYTE ESTERASE UR-ACNC: (no result)
LYMPHOCYTES # BLD AUTO: 0.7 K/UL — LOW (ref 1.2–3.4)
LYMPHOCYTES # BLD AUTO: 3.5 % — LOW (ref 20.5–51.1)
MCHC RBC-ENTMCNC: 31.4 G/DL — LOW (ref 32–37)
MCHC RBC-ENTMCNC: 34 PG — HIGH (ref 27–31)
MCV RBC AUTO: 108.2 FL — HIGH (ref 81–99)
MONOCYTES # BLD AUTO: 0.7 K/UL — HIGH (ref 0.1–0.6)
MONOCYTES NFR BLD AUTO: 3.5 % — SIGNIFICANT CHANGE UP (ref 1.7–9.3)
NEUTROPHILS # BLD AUTO: 18.31 K/UL — HIGH (ref 1.4–6.5)
NEUTROPHILS NFR BLD AUTO: 91.3 % — HIGH (ref 42.2–75.2)
NITRITE UR-MCNC: NEGATIVE — SIGNIFICANT CHANGE UP
NT-PROBNP SERPL-SCNC: HIGH PG/ML (ref 0–300)
PH UR: 7.5 — SIGNIFICANT CHANGE UP (ref 5–8)
PLATELET # BLD AUTO: 133 K/UL — SIGNIFICANT CHANGE UP (ref 130–400)
POTASSIUM SERPL-MCNC: 3.8 MMOL/L — SIGNIFICANT CHANGE UP (ref 3.5–5)
POTASSIUM SERPL-SCNC: 3.8 MMOL/L — SIGNIFICANT CHANGE UP (ref 3.5–5)
PROT SERPL-MCNC: 7.1 G/DL — SIGNIFICANT CHANGE UP (ref 6–8)
PROT UR-MCNC: 100 MG/DL
PROTHROM AB SERPL-ACNC: 25.6 SEC — HIGH (ref 9.95–12.87)
RBC # BLD: 3.06 M/UL — LOW (ref 4.2–5.4)
RBC # FLD: 15.5 % — HIGH (ref 11.5–14.5)
SODIUM SERPL-SCNC: 141 MMOL/L — SIGNIFICANT CHANGE UP (ref 135–146)
SP GR SPEC: 1.01 — SIGNIFICANT CHANGE UP (ref 1.01–1.03)
TROPONIN T SERPL-MCNC: 0.07 NG/ML — CRITICAL HIGH
UROBILINOGEN FLD QL: 0.2 MG/DL — SIGNIFICANT CHANGE UP (ref 0.2–0.2)
WBC # BLD: 20.05 K/UL — HIGH (ref 4.8–10.8)
WBC # FLD AUTO: 20.05 K/UL — HIGH (ref 4.8–10.8)

## 2018-06-23 RX ORDER — LATANOPROST 0.05 MG/ML
1 SOLUTION/ DROPS OPHTHALMIC; TOPICAL AT BEDTIME
Qty: 0 | Refills: 0 | Status: DISCONTINUED | OUTPATIENT
Start: 2018-06-23 | End: 2018-06-26

## 2018-06-23 RX ORDER — FUROSEMIDE 40 MG
60 TABLET ORAL ONCE
Qty: 0 | Refills: 0 | Status: COMPLETED | OUTPATIENT
Start: 2018-06-23 | End: 2018-06-23

## 2018-06-23 RX ORDER — METOPROLOL TARTRATE 50 MG
25 TABLET ORAL DAILY
Qty: 0 | Refills: 0 | Status: DISCONTINUED | OUTPATIENT
Start: 2018-06-23 | End: 2018-06-25

## 2018-06-23 RX ORDER — SENNA PLUS 8.6 MG/1
1 TABLET ORAL AT BEDTIME
Qty: 0 | Refills: 0 | Status: DISCONTINUED | OUTPATIENT
Start: 2018-06-23 | End: 2018-06-26

## 2018-06-23 RX ORDER — ATORVASTATIN CALCIUM 80 MG/1
10 TABLET, FILM COATED ORAL AT BEDTIME
Qty: 0 | Refills: 0 | Status: DISCONTINUED | OUTPATIENT
Start: 2018-06-23 | End: 2018-06-26

## 2018-06-23 RX ORDER — TIMOLOL 0.5 %
1 DROPS OPHTHALMIC (EYE)
Qty: 0 | Refills: 0 | Status: DISCONTINUED | OUTPATIENT
Start: 2018-06-23 | End: 2018-06-26

## 2018-06-23 RX ORDER — DORZOLAMIDE HYDROCHLORIDE 20 MG/ML
1 SOLUTION/ DROPS OPHTHALMIC THREE TIMES A DAY
Qty: 0 | Refills: 0 | Status: DISCONTINUED | OUTPATIENT
Start: 2018-06-23 | End: 2018-06-26

## 2018-06-23 RX ORDER — PIPERACILLIN AND TAZOBACTAM 4; .5 G/20ML; G/20ML
3.38 INJECTION, POWDER, LYOPHILIZED, FOR SOLUTION INTRAVENOUS ONCE
Qty: 0 | Refills: 0 | Status: COMPLETED | OUTPATIENT
Start: 2018-06-23 | End: 2018-06-23

## 2018-06-23 RX ORDER — VANCOMYCIN HCL 1 G
1500 VIAL (EA) INTRAVENOUS ONCE
Qty: 0 | Refills: 0 | Status: COMPLETED | OUTPATIENT
Start: 2018-06-23 | End: 2018-06-23

## 2018-06-23 RX ORDER — SIMETHICONE 80 MG/1
80 TABLET, CHEWABLE ORAL THREE TIMES A DAY
Qty: 0 | Refills: 0 | Status: DISCONTINUED | OUTPATIENT
Start: 2018-06-23 | End: 2018-06-25

## 2018-06-23 RX ORDER — DIGOXIN 250 MCG
0.12 TABLET ORAL DAILY
Qty: 0 | Refills: 0 | Status: DISCONTINUED | OUTPATIENT
Start: 2018-06-23 | End: 2018-06-26

## 2018-06-23 RX ORDER — IPRATROPIUM/ALBUTEROL SULFATE 18-103MCG
3 AEROSOL WITH ADAPTER (GRAM) INHALATION ONCE
Qty: 0 | Refills: 0 | Status: COMPLETED | OUTPATIENT
Start: 2018-06-23 | End: 2018-06-23

## 2018-06-23 RX ORDER — ALLOPURINOL 300 MG
300 TABLET ORAL DAILY
Qty: 0 | Refills: 0 | Status: DISCONTINUED | OUTPATIENT
Start: 2018-06-23 | End: 2018-06-26

## 2018-06-23 RX ORDER — DOCUSATE SODIUM 100 MG
100 CAPSULE ORAL
Qty: 0 | Refills: 0 | Status: DISCONTINUED | OUTPATIENT
Start: 2018-06-23 | End: 2018-06-25

## 2018-06-23 RX ORDER — APIXABAN 2.5 MG/1
2.5 TABLET, FILM COATED ORAL EVERY 12 HOURS
Qty: 0 | Refills: 0 | Status: DISCONTINUED | OUTPATIENT
Start: 2018-06-23 | End: 2018-06-24

## 2018-06-23 RX ORDER — AZITHROMYCIN 500 MG/1
500 TABLET, FILM COATED ORAL ONCE
Qty: 0 | Refills: 0 | Status: COMPLETED | OUTPATIENT
Start: 2018-06-23 | End: 2018-06-23

## 2018-06-23 RX ORDER — FUROSEMIDE 40 MG
40 TABLET ORAL ONCE
Qty: 0 | Refills: 0 | Status: COMPLETED | OUTPATIENT
Start: 2018-06-23 | End: 2018-06-23

## 2018-06-23 RX ADMIN — Medication 3 MILLILITER(S): at 19:53

## 2018-06-23 RX ADMIN — PIPERACILLIN AND TAZOBACTAM 200 GRAM(S): 4; .5 INJECTION, POWDER, LYOPHILIZED, FOR SOLUTION INTRAVENOUS at 20:42

## 2018-06-23 RX ADMIN — Medication 60 MILLIGRAM(S): at 22:01

## 2018-06-23 RX ADMIN — Medication 300 MILLIGRAM(S): at 22:01

## 2018-06-23 RX ADMIN — AZITHROMYCIN 255 MILLIGRAM(S): 500 TABLET, FILM COATED ORAL at 20:04

## 2018-06-23 RX ADMIN — Medication 40 MILLIGRAM(S): at 19:53

## 2018-06-23 NOTE — ED PROVIDER NOTE - OBJECTIVE STATEMENT
76y/o F, h/o CELY, CHF-on dig and lasix, presents with DMII, afib-on elliquis, DVT, COPD, indwelling catheter, presents from [facility] for worsening SOB for the the past few days. Pt was seen here 3 days ago for similar symptoms. pt had an O2 sat between 70-77% on home O2 and was switched to a non-rebreather and has maintained an O2 sat in the low 90's after afew neb treatments. Denies fever, CP, abd pain, n/v, worsening leg edema 76y/o F, h/o CELY, CHF-on dig and lasix, DMII, afib-on elliquis, DVT, COPD, indwelling catheter, presents from [facility] for worsening SOB for the the past few days. Pt was seen here 3 days ago for similar symptoms. pt had an O2 sat between 70-77% on home O2 and was switched to a non-rebreather and has maintained an O2 sat in the low 90's after afew neb treatments. Denies fever, CP, abd pain, n/v, worsening leg edema 76y/o F, h/o CELY, CHF-on dig and lasix, DMII, afib-on elliquis, DVT, COPD, RA, indwelling catheter, presents from [facility] for worsening SOB, weakness, and cough for the past few days. Pt was seen here 3 days ago for similar symptoms-worse now. pt had an O2 sat between 70-77% on home O2 and was switched to a non-rebreather and has maintained an O2 sat in the low 90's after a few neb treatments. Denies fever, CP, abd pain, n/v, worsening leg edema

## 2018-06-23 NOTE — ED PROVIDER NOTE - ATTENDING CONTRIBUTION TO CARE
pt presents with sob times one day. b/l course bs.  labs, cxr, ekg, abx, lasix, bipap, cx and admit. I personally evaluated the patient. I reviewed the Resident’s or Physician Assistant’s note (as assigned above), and agree with the findings and plan except as documented in my note.   pt presents with sob times one day. b/l course bs.  labs, cxr, ekg, abx, lasix, bipap, cx and admit.

## 2018-06-23 NOTE — ED PROVIDER NOTE - PROGRESS NOTE DETAILS
Pt brought to crit area upon arrival. Pt placed on nonrebreather with minimal improvement so switched to bipap. Large bore IV's placed and labs drawn. On bedside sono, pt with some B-line but not in florid CHF. EKG, CXR obtained. Abx ordered. Rest of labs pending. Patient not given IVF due to CHF exacerbation and fluid overload on CXR.

## 2018-06-23 NOTE — PROVIDER CONTACT NOTE (OTHER) - SITUATION
Patient lying in stretcher, asleep, respirations unlabored, on Venti mask 30% O2. Noted with fluctuations in O2 sat from 76-85%. MD made aware & to come eval patient.

## 2018-06-23 NOTE — ED PROVIDER NOTE - CARE PLAN
Principal Discharge DX:	CHF exacerbation  Secondary Diagnosis:	Elevated troponin  Secondary Diagnosis:	Pneumonia

## 2018-06-23 NOTE — H&P ADULT - NSHPLABSRESULTS_GEN_ALL_CORE
Labs:                        10.4   20.05 )-----------( 133      ( 2018 19:33 )             33.1                 141  |  89<L>  |  116<HH>  ----------------------------<  122<H>  3.8   |  39<H>  |  2.0<H>    Ca    9.1      2018 19:33  Mg     2.8         TPro  7.1  /  Alb  3.3<L>  /  TBili  0.7  /  DBili  x   /  AST  26  /  ALT  8   /  AlkPhos  75      LIVER FUNCTIONS - ( 2018 19:33 )  Alb: 3.3 g/dL / Pro: 7.1 g/dL / ALK PHOS: 75 U/L / ALT: 8 U/L / AST: 26 U/L / GGT: x         PT/INR - ( 2018 19:33 )   PT: 25.60 sec;   INR: 2.33 ratio      PTT - ( 2018 19:33 )  PTT:33.4 sec  CARDIAC MARKERS ( 2018 19:33 )  x     / 0.07 ng/mL / 36 U/L / x     / 1.6 ng/mL    Urinalysis Basic - ( 2018 20:05 )    Color: Yellow / Appearance: Cloudy / S.015 / pH: x  Gluc: x / Ketone: Negative  / Bili: Negative / Urobili: 0.2 mg/dL   Blood: x / Protein: 100 mg/dL / Nitrite: Negative   Leuk Esterase: Large / RBC: 25-50 /HPF / WBC 26-50 /HPF   Sq Epi: x / Non Sq Epi: Few /HPF / Bacteria: Moderate /HPF    I&O's Summary    2018 07:01  -  2018 23:17  --------------------------------------------------------  IN: 0 mL / OUT: 350 mL / NET: -350 mL    Vital Signs Last 24 Hrs  T(C): 37.3 (2018 19:23), Max: 37.3 (2018 19:23)  T(F): 99.1 (2018 19:23), Max: 99.1 (2018 19:23)  HR: 105 (2018 22:05) (80 - 105)  BP: 112/53 (2018 22:05) (97/36 - 141/49)  BP(mean): --  RR: 20 (2018 22:05) (20 - 22)  SpO2: 91% (2018 22:05) (80% - 93%)

## 2018-06-23 NOTE — ED ADULT NURSE REASSESSMENT NOTE - NS ED NURSE REASSESS COMMENT FT1
Dr. Mccray at bedside, evaluated pt for desaturation. Ordered Lasix 60 mg IVP STAT & BiPAP place. RT at bedside applying BiPAP. Improvement in saturation noted to 91%. Patient's son at bedside and updated on plan of care. Will continue to monitor closely. Dr. Mccray at bedside, evaluated pt for desaturation. Ordered Lasix 60 mg IVP STAT & BiPAP placement. RT at bedside applying BiPAP. Improvement in saturation noted to 91%. Patient's son at bedside and updated on plan of care. Will continue to monitor closely.

## 2018-06-23 NOTE — H&P ADULT - NSHPPHYSICALEXAM_GEN_ALL_CORE
Physical Exam:    -     General : Awake and in moderate respiratory distress    -      HEENT: NC    -      Cardiac: irregularly irregular rate    -      Pulm: coarse breath sounds on expiration bilaterally    -      GI: abdomen soft, non-tender    -      Musculoskeletal: 2+ pitting edema over bilateral lower extremity with venous stasis changes most prominent over LLE    -      Neuro: AAO x3    CENTRAL LINE: [ ] YES [X] NO     TUBES: None    LEHMAN: [X] YES (Present on admission)

## 2018-06-23 NOTE — ED ADULT NURSE NOTE - OBJECTIVE STATEMENT
78 y/o female BIBEMS from [facility] for hypoxia and hypotension. Pt's saturation noted in 80s on O2 via NC. Pt speaking in full, complete sentences. + wet cough noted

## 2018-06-23 NOTE — H&P ADULT - HISTORY OF PRESENT ILLNESS
The patient is a 77-year-old female with a PMH of HTN, type II DM, CAD, A-Fib (on Eliquis), HFpEF, endocarditis, COPD (on 2 L home O2), DVT, CKD, GERD, and depression who presented from SNF with progressively increasing SOB and bilateral lower extremity edema. History was obtained from the patient's son who stated that over the past 3 weeks he noted his mother to have increased swelling of her legs. The patient was brought to the ED on 6/20 where she received diuretic therapy, Frazier catheter placement, and was discharged back to SNF. However, the patient continued to experience SOB and was found to be lethargic. Upon current presentation, the patient was noted to be hypotensive, tachycardic, tachypneic, and hypoxic. She was placed on a NRB mask and subsequently switched to a venturi mask where her SpO2 was ~93%. She received nebulizer treatment, Lasix 40 mg IV, Zosyn, Vancomycin, and Azithromycin in the ED.    At baseline, the patient is alert and able to ambulate at her SNF. The patient is a 77-year-old female with a PMH of HTN, type II DM, CAD, A-Fib (on Eliquis), HFpEF, endocarditis, COPD (on 2 L home O2), DVT, CKD, GERD, and depression who presented from SNF with progressively increasing SOB and bilateral lower extremity edema. History was obtained from the patient's son who stated that over the past 3 weeks he noted his mother to have increased swelling of her legs. The patient was brought to the ED on 6/20 where she received diuretic therapy, Frazier catheter placement, and was discharged back to SNF. However, the patient continued to experience SOB and was found to be lethargic. Upon current presentation, the patient was noted to be hypotensive, tachycardic, tachypneic, and hypoxic. She was placed on a NRB mask and subsequently switched to a venturi mask where her SpO2 was ~93%. She received nebulizer treatment, Lasix 40 mg IV, Zosyn, Vancomycin, and Azithromycin in the ED.    At baseline, the patient is alert and able to ambulate at her SNF.  Her son states that she has been evaluated in the hospital three times this year for similar complaints and has received increased amounts of Lasix.

## 2018-06-23 NOTE — H&P ADULT - NSHPREVIEWOFSYSTEMS_GEN_ALL_CORE
REVIEW OF SYSTEMS:    CONSTITUTIONAL: No fevers or chills  EYES/ENT: No visual changes  NECK: No pain or stiffness  RESPIRATORY: (+) shortness of breath  CARDIOVASCULAR: No chest pain or palpitations; (+) bilateral lower extremity swelling  GASTROINTESTINAL: No abdominal or epigastric pain. No nausea, vomiting, or hematemesis; No diarrhea or constipation.  GENITOURINARY: No dysuria, frequency or hematuria

## 2018-06-23 NOTE — H&P ADULT - ASSESSMENT
Assessment:  A 77y Female with a PMH of HTN, type II DM, CAD, A-Fib (on Eliquis), HFpEF, endocarditis, COPD (on 2 L home O2), DVT, CKD, GERD, and depression  who presented from SNF with progressive SOB and bilateral lower extremity edema likely secondary to acute on chronic HFpEF complicated by CELY on CKD and sepsis secondary to pneumonia vs. UTI.    Plan:  1. Acute on chronic HFpEF  - admit to telemetry  - troponin: 0.02 -> 0.07  - BNP: 34,106 (last one was 9,445 on 6/20)  - EKG: A-Fib with PVC, no acute ST-T changes  - CXR: stable bibasilar opacities  - start Lasix 40 mg IV q12H; holding Bumex and Metolazone for now; consider increasing Lasix or resuming other diuretics if symptoms worsen  - monitor strict input/output; check daily weights  - 2D echo and LE venous duplex pending  - cardiology consulted    2.    3. Assessment:  A 77y Female with a PMH of HTN, type II DM, CAD, A-Fib (on Eliquis), HFpEF, endocarditis, COPD (on 2 L home O2), DVT, CKD, GERD, and depression  who presented from SNF with progressive SOB and bilateral lower extremity edema likely secondary to acute on chronic HFpEF complicated by CELY on CKD and sepsis secondary to pneumonia vs. UTI.    Plan:  1. Acute on chronic hypoxic respiratory failure complicated by acute on chronic HFpEF  - admit to telemetry  - troponin: 0.02 -> 0.07  - BNP: 34,106 (last one was 9,445 on 6/20)  - EKG: A-Fib with PVC, no acute ST-T changes  - CXR: stable bibasilar opacities  - start Lasix 40 mg IV q12H; holding Bumex and Metolazone for now; consider increasing Lasix or resuming other diuretics if symptoms worsen  - will continue BiPAP overnight; check ABG after 1 hour; duonebs daily and prn  - monitor strict input/output; check daily weights  - low sodium diet with 1L fluid restriction daily  - 2D echo and LE venous duplex pending  - cardiology consulted    2. Sepsis secondary to HCAP vs. CAUTI  - patient was tachycardic, hypotensive, and tachypneic on presentation  - WBC count is 20 (was previously 4 on 6/20)  - U/A: large leukocytes, no nitrites, large blood, moderate bacteria, 26-50 WBC  - start Vancomycin, Cefepime, and Levaquin  - check nasal MRSA PCR  - f/u blood and urine cultures, Urinary Legionella Ag  - aspiration precautions    3. CELY on CKD likely secondary to cardiorenal syndrome  - baseline SCr is ~1.4 to 1.5  - check urine electrolytes  - avoid nephrotoxic medications  - continue to monitor I/O's via Frazier catheter for now    4. A-Fib  - continue rate control and anticoagulation  - digoxin level: 0.3    5. HTN  - continue Metoprolol and monitor BP    6. Type II DM  - monitor FS and start insulin regimen if FS persistently >180    7. Assessment:  A 77y Female with a PMH of HTN, type II DM, CAD, A-Fib (on Eliquis), HFpEF, endocarditis, COPD (on 2 L home O2), DVT, CKD, GERD, and depression  who presented from SNF with progressive SOB and bilateral lower extremity edema likely secondary to acute on chronic HFpEF complicated by CELY on CKD and sepsis secondary to pneumonia vs. UTI.    Plan:  1. Acute on chronic hypoxic respiratory failure complicated by acute on chronic HFpEF  - admit to telemetry  - troponin: 0.02 -> 0.07  - BNP: 34,106 (last one was 9,445 on 6/20)  - EKG: A-Fib with PVC, no acute ST-T changes  - CXR: stable bibasilar opacities  - start Lasix 40 mg IV q12H; holding Bumex and Metolazone for now; consider increasing Lasix or resuming other diuretics if symptoms worsen  - will continue BiPAP overnight; check ABG after 1 hour; duonebs daily and prn  - monitor strict input/output; check daily weights  - low sodium diet with 1L fluid restriction daily  - 2D echo and LE venous duplex pending  - cardiology and pulmonology consulted    2. Sepsis secondary to HCAP vs. CAUTI  - patient was tachycardic, hypotensive, and tachypneic on presentation  - WBC count is 20 (was previously 4 on 6/20)  - U/A: large leukocytes, no nitrites, large blood, moderate bacteria, 26-50 WBC  - start Vancomycin, Cefepime, and Levaquin  - check nasal MRSA PCR  - f/u blood and urine cultures, Urinary Legionella Ag  - aspiration precautions    3. CELY on CKD likely secondary to cardiorenal syndrome  - baseline SCr is ~1.4 to 1.5  - check urine electrolytes  - avoid nephrotoxic medications  - continue to monitor I/O's via Frazier catheter for now    4. A-Fib  - continue rate control and anticoagulation  - digoxin level: 0.3    5. HTN  - continue Metoprolol and monitor BP    6. Type II DM  - monitor FS and start insulin regimen if FS persistently >180    7. Chronic macrocytic anemia  - Hgb currently at baseline  - check type & screen and transfuse PRBC if Hgb <8  - iron: 26, TIBC: 208, ferritin: 199, %sat: 12    8. History of glaucoma  - continue home eye drops    9. DVT and GI prophylaxis    10. Disposition  - prognosis is guarded  - Full Code  - HCP is daya Cross who can be reached at [phone] Assessment:  A 77y Female with a PMH of HTN, type II DM, CAD, A-Fib (on Eliquis), HFpEF, endocarditis, COPD (on 2 L home O2), DVT, CKD, GERD, and depression  who presented from SNF with progressive SOB and bilateral lower extremity edema likely secondary to acute on chronic HFpEF complicated by CELY on CKD and sepsis secondary to pneumonia vs. UTI.    Plan:  1. Acute on chronic hypoxic respiratory failure in the setting of acute on chronic HFpEF  - admit to telemetry  - troponin: 0.02 -> 0.07  - BNP: 34,106 (last one was 9,445 on 6/20)  - EKG: A-Fib with PVC, no acute ST-T changes  - CXR: stable bibasilar opacities  - start Lasix 40 mg IV q12H; holding Bumex and Metolazone for now; consider increasing Lasix or resuming other diuretics if symptoms worsen  - will continue BiPAP overnight; patient improved from 84% to 92% after starting BiPAP; check ABG after 1 hour; duonebs daily and prn  - monitor strict input/output; check daily weights  - low sodium diet with 1L fluid restriction daily  - 2D echo and LE venous duplex pending  - cardiology and pulmonology consulted    2. Sepsis secondary to HCAP vs. CAUTI  - patient was tachycardic, hypotensive, and tachypneic on presentation  - WBC count is 20 (was previously 4 on 6/20)  - U/A: large leukocytes, no nitrites, large blood, moderate bacteria, 26-50 WBC  - start Vancomycin, Cefepime, and Levaquin  - check nasal MRSA PCR  - f/u blood and urine cultures, Urinary Legionella Ag  - aspiration precautions    3. CELY on CKD likely secondary to cardiorenal syndrome type I  - baseline SCr is ~1.4 to 1.5  - check urine electrolytes  - avoid nephrotoxic medications  - continue to monitor I/O's via Frazier catheter for now    4. A-Fib  - continue rate control and anticoagulation  - digoxin level: 0.3    5. HTN  - continue Metoprolol and monitor BP    6. Type II DM  - monitor FS and start insulin regimen if FS persistently >180    7. Chronic macrocytic anemia  - Hgb currently at baseline  - check type & screen and transfuse PRBC if Hgb <8  - iron: 26, TIBC: 208, ferritin: 199, %sat: 12    8. History of glaucoma  - continue home eye drops    9. DVT and GI prophylaxis    10. Disposition  - prognosis is guarded  - Full code  - HCP is daya Cross who can be reached at [phone] Assessment:  A 77y Female with a PMH of HTN, type II DM, CAD, A-Fib (on Eliquis), HFpEF, endocarditis, COPD (on 2 L home O2), DVT, CKD, GERD, and depression  who presented from SNF with progressive SOB and bilateral lower extremity edema likely secondary to acute on chronic HFpEF complicated by CELY on CKD and sepsis secondary to pneumonia vs. UTI.    Plan:  1. Acute on chronic hypoxic respiratory failure in the setting of acute on chronic HFpEF  - admit to telemetry  - troponin: 0.02 -> 0.07  - BNP: 34,106 (last one was 9,445 on 6/20)  - EKG: A-Fib with PVC, no acute ST-T changes  - CXR: stable bibasilar opacities  - start Lasix 40 mg IV q12H; holding Bumex and Metolazone for now; consider increasing Lasix or resuming other diuretics if symptoms worsen  - will continue BiPAP overnight; patient improved from 84% to 92% after starting BiPAP; check ABG after 1 hour; duonebs daily and prn  - monitor strict input/output; check daily weights  - low sodium diet with 1L fluid restriction daily  - 2D echo and LE venous duplex pending  - cardiology and pulmonology consulted    2. Sepsis secondary to HCAP vs. CAUTI  - patient was tachycardic, hypotensive, and tachypneic on presentation  - WBC count is 20 (was previously 4 on 6/20)  - U/A: large leukocytes, no nitrites, large blood, moderate bacteria, 26-50 WBC  - start Vancomycin, Cefepime, and Levaquin  - check nasal MRSA PCR  - f/u blood and urine cultures, Urinary Legionella Ag  - aspiration precautions    3. CELY on CKD likely secondary to cardiorenal syndrome type I  - baseline SCr is ~1.4 to 1.5  - check urine electrolytes  - avoid nephrotoxic medications  - continue to monitor I/O's via Frazier catheter for now    4. A-Fib  - continue rate control and anticoagulation  - digoxin level: 0.3    5. HTN  - continue Metoprolol and monitor BP    6. Type II DM  - monitor FS and start insulin regimen if FS persistently >180    7. Chronic macrocytic anemia  - Hgb currently at baseline  - check type & screen and transfuse PRBC if Hgb <8  - iron: 26, TIBC: 208, ferritin: 199, %sat: 12    8. History of glaucoma  - continue home eye drops    9. DVT and GI prophylaxis    10. Disposition  - prognosis is guarded  - Full code  - HCP is daya Cross who can be reached at [phone]

## 2018-06-23 NOTE — H&P ADULT - ATTENDING COMMENTS
77-year-old female with a PMH of HTN, type II DM, CAD, A-Fib (on Eliquis), HFpEF, endocarditis, COPD (on 2 L home O2), DVT, CKD, GERD, and depression who presented from SNF with progressively increasing SOB and bilateral lower extremity edema. Patient's son noticed over the past 3 weeks increased swelling of her legs. The patient was brought to the ED on 6/20 where she received diuretic therapy, Frazier catheter placement, and was discharged back to SNF. However, the patient continued to experience SOB and was found to be lethargic. Upon current presentation, the patient was noted to be hypotensive, tachycardic, tachypneic, and hypoxic. She was placed on a NRB mask and subsequently switched to a venturi mask where her SpO2 was ~93%. She received nebulizer treatment, Lasix 40 mg IV, Zosyn, Vancomycin, and Azithromycin in the ED.    At baseline, the patient is alert and able to ambulate at her SNF.  Her son states that she has been evaluated in the hospital three times this year for similar complaints and has received increased amounts of Lasix.    # Acute on chronic hypoxic respiratory failure   # Acute on chronic HFpEF exacerbation  #toxic metabolic encephalopathy  #Suspected GNR PNA  #Sepsis (2/2 HCAP vs CAUTI)  Since admission patient has passed significant amount of urine with the lasix 40 mg Q12H (to the extent that she has had alkalosis from the time of admission). But despite of that Breathing labored, requiring constant Bipap, Still hypoxemic. No crackles presently but does have jugular fullness.   HCAP may be a bigger part of the problem. C/w Empiric Vanc, Cefepime, LVQ. C/w IV diuresis.   On exam, also noticed Right base bronchial breath sounds which is abnormal. Suspect RLL collapse. Unclear from CXR.   ICU eval.  Does she need Chest CT ?   Also request Cardio fellow to take a look at her soon.   Not sure if anything to add from cardio besides the lasix. Patient's BP is good, extremities warm and making good urine, hence probably no role for inotropes.   Patient's Digoxin level however is low. Please increase the dose after d/w Cardio.         Bipap HS + PRN. duonebs daily and prn  - monitor strict input/output; check daily weights  - low sodium diet with 1L fluid restriction daily  - 2D echo and LE venous duplex pending  - cardiology and pulmonology consulted    # CELY on CKD ( likely secondary to cardiorenal syndrome +/- Sepsis)  #Metabolic alkalosis with partially compensating Respiratory acidosis  Acetazolamide 250 mg Q12H if she is able to swallow (is on continuous Bipap right now)  Correct the underlying hypokalemia. IV Kcl 20 meq x 2 today.    Potassium sparing diuretic (spironolactone) from tomorrow  Nephro consult. Monitor BMP closely.    #A-Fib  - continue rate control and anticoagulation  - digoxin level: 0.3  # h/o HTN  - continue Metoprolol and monitor BP    # Type II DM  - monitor FS and start insulin regimen if FS persistently >180    # Chronic macrocytic anemia  - Hgb currently at baseline  - check type & screen and transfuse PRBC if Hgb <8  - iron: 26, TIBC: 208, ferritin: 199, %sat: 12    # History of glaucoma  - continue home eye drops    #DVT and GI prophylaxis    # Disposition  - prognosis is guarded  - Full code  - HCP is daya Cross who can be reached at [phone] 77-year-old female with a PMH of HTN, type II DM, CAD, A-Fib (on Eliquis), HFpEF, endocarditis, COPD (on 2 L home O2), DVT, CKD, GERD, and depression who presented from SNF with progressively increasing SOB and bilateral lower extremity edema. Patient's son noticed over the past 3 weeks increased swelling of her legs. The patient was brought to the ED on 6/20 where she received diuretic therapy, Frazier catheter placement, and was discharged back to SNF. However, the patient continued to experience SOB and was found to be lethargic. Upon current presentation, the patient was noted to be hypotensive, tachycardic, tachypneic, and hypoxic. She was placed on a NRB mask and subsequently switched to a venturi mask where her SpO2 was ~93%. She received nebulizer treatment, Lasix 40 mg IV, Zosyn, Vancomycin, and Azithromycin in the ED.    At baseline, the patient is alert and able to ambulate at her SNF.  Her son states that she has been evaluated in the hospital three times this year for similar complaints and has received increased amounts of Lasix.    # Acute on chronic hypoxic respiratory failure   # Acute on chronic HFpEF exacerbation  #toxic metabolic encephalopathy  #Suspected GNR PNA  #Sepsis (2/2 HCAP vs CAUTI)  Since admission patient has passed significant amount of urine with the lasix 40 mg Q12H (to the extent that she has had alkalosis from the time of admission). But despite of that Breathing labored, requiring constant Bipap, Still hypoxemic. No crackles presently but does have jugular fullness.   HCAP may be a bigger part of the problem. C/w Empiric Vanc, Cefepime, LVQ. C/w IV diuresis.   On exam, also noticed Right base bronchial breath sounds which is abnormal. Suspect RLL collapse. Unclear from CXR.   ICU eval.  Does she need Chest CT ?   Also request Cardio fellow to take a look at her soon.   Not sure if anything to add from cardio besides the lasix. Patient's BP is good, extremities warm and making good urine, hence probably no role for inotropes.   Patient's Digoxin level however is low. Please increase the dose after d/w Cardio.   Check Vanco trough please      Bipap HS + PRN. duonebs daily and prn  - monitor strict input/output; check daily weights  - low sodium diet with 1L fluid restriction daily  - 2D echo and LE venous duplex pending  - cardiology and pulmonology consulted    # CELY on CKD ( likely secondary to cardiorenal syndrome +/- Sepsis)  #Metabolic alkalosis with partially compensating Respiratory acidosis  Acetazolamide 250 mg Q12H if she is able to swallow (is on continuous Bipap right now)  Correct the underlying hypokalemia. IV Kcl 20 meq x 2 today.    Potassium sparing diuretic (spironolactone) from tomorrow  Nephro consult. Monitor BMP closely.    #A-Fib  - continue rate control and anticoagulation  - digoxin level: 0.3  # h/o HTN  - continue Metoprolol and monitor BP    # Type II DM  - monitor FS and start insulin regimen if FS persistently >180    # Chronic macrocytic anemia  - Hgb currently at baseline  - check type & screen and transfuse PRBC if Hgb <8  - iron: 26, TIBC: 208, ferritin: 199, %sat: 12    # History of glaucoma  - continue home eye drops    #DVT and GI prophylaxis    # Disposition  - prognosis is guarded  - Full code  - HCP is daya Cross who can be reached at [phone]  (tried calling him - left a voicemail)

## 2018-06-24 LAB
ALBUMIN SERPL ELPH-MCNC: 3.1 G/DL — LOW (ref 3.5–5.2)
ALLERGY+IMMUNOLOGY DIAG STUDY NOTE: SIGNIFICANT CHANGE UP
ALP SERPL-CCNC: 70 U/L — SIGNIFICANT CHANGE UP (ref 30–115)
ALT FLD-CCNC: 7 U/L — SIGNIFICANT CHANGE UP (ref 0–41)
ANION GAP SERPL CALC-SCNC: 12 MMOL/L — SIGNIFICANT CHANGE UP (ref 7–14)
ANION GAP SERPL CALC-SCNC: 14 MMOL/L — SIGNIFICANT CHANGE UP (ref 7–14)
AST SERPL-CCNC: 17 U/L — SIGNIFICANT CHANGE UP (ref 0–41)
BASE EXCESS BLDA CALC-SCNC: 14.8 MMOL/L — HIGH (ref -2–2)
BASE EXCESS BLDA CALC-SCNC: 15.2 MMOL/L — HIGH (ref -2–2)
BASOPHILS # BLD AUTO: 0.01 K/UL — SIGNIFICANT CHANGE UP (ref 0–0.2)
BASOPHILS NFR BLD AUTO: 0.1 % — SIGNIFICANT CHANGE UP (ref 0–1)
BILIRUB SERPL-MCNC: 0.7 MG/DL — SIGNIFICANT CHANGE UP (ref 0.2–1.2)
BLD GP AB SCN SERPL QL: (no result)
BUN SERPL-MCNC: 116 MG/DL — CRITICAL HIGH (ref 10–20)
CALCIUM SERPL-MCNC: 8.4 MG/DL — LOW (ref 8.5–10.1)
CALCIUM SERPL-MCNC: 9.1 MG/DL — SIGNIFICANT CHANGE UP (ref 8.5–10.1)
CHLORIDE SERPL-SCNC: 87 MMOL/L — LOW (ref 98–110)
CHLORIDE SERPL-SCNC: 87 MMOL/L — LOW (ref 98–110)
CO2 SERPL-SCNC: 36 MMOL/L — HIGH (ref 17–32)
CO2 SERPL-SCNC: 39 MMOL/L — HIGH (ref 17–32)
CREAT SERPL-MCNC: 1.9 MG/DL — HIGH (ref 0.7–1.5)
CREAT SERPL-MCNC: 2 MG/DL — HIGH (ref 0.7–1.5)
DIR ANTIGLOB POLYSPECIFIC INTERPRETATION: SIGNIFICANT CHANGE UP
EOSINOPHIL # BLD AUTO: 0.02 K/UL — SIGNIFICANT CHANGE UP (ref 0–0.7)
EOSINOPHIL NFR BLD AUTO: 0.1 % — SIGNIFICANT CHANGE UP (ref 0–8)
GLUCOSE SERPL-MCNC: 107 MG/DL — HIGH (ref 70–99)
GLUCOSE SERPL-MCNC: 112 MG/DL — HIGH (ref 70–99)
HCO3 BLDA-SCNC: 40 MMOL/L — HIGH (ref 23–27)
HCO3 BLDA-SCNC: 42 MMOL/L — CRITICAL HIGH (ref 23–27)
HCT VFR BLD CALC: 26.7 % — LOW (ref 37–47)
HCT VFR BLD CALC: 28.3 % — LOW (ref 37–47)
HGB BLD-MCNC: 8.4 G/DL — LOW (ref 12–16)
HGB BLD-MCNC: 8.7 G/DL — LOW (ref 12–16)
IMM GRANULOCYTES NFR BLD AUTO: 0.4 % — HIGH (ref 0.1–0.3)
INR BLD: 1.67 RATIO — HIGH (ref 0.65–1.3)
LACTATE SERPL-SCNC: 0.8 MMOL/L — SIGNIFICANT CHANGE UP (ref 0.5–2.2)
LACTATE SERPL-SCNC: 1.5 MMOL/L — SIGNIFICANT CHANGE UP (ref 0.5–2.2)
LYMPHOCYTES # BLD AUTO: 0.93 K/UL — LOW (ref 1.2–3.4)
LYMPHOCYTES # BLD AUTO: 6.5 % — LOW (ref 20.5–51.1)
MAGNESIUM SERPL-MCNC: 2.6 MG/DL — HIGH (ref 1.8–2.4)
MCHC RBC-ENTMCNC: 30.7 G/DL — LOW (ref 32–37)
MCHC RBC-ENTMCNC: 31.5 G/DL — LOW (ref 32–37)
MCHC RBC-ENTMCNC: 32.6 PG — HIGH (ref 27–31)
MCHC RBC-ENTMCNC: 33.3 PG — HIGH (ref 27–31)
MCV RBC AUTO: 106 FL — HIGH (ref 81–99)
MCV RBC AUTO: 106 FL — HIGH (ref 81–99)
MONOCYTES # BLD AUTO: 0.59 K/UL — SIGNIFICANT CHANGE UP (ref 0.1–0.6)
MONOCYTES NFR BLD AUTO: 4.1 % — SIGNIFICANT CHANGE UP (ref 1.7–9.3)
NEUTROPHILS # BLD AUTO: 12.77 K/UL — HIGH (ref 1.4–6.5)
NEUTROPHILS NFR BLD AUTO: 88.8 % — HIGH (ref 42.2–75.2)
NRBC # BLD: 0 /100 WBCS — SIGNIFICANT CHANGE UP (ref 0–0)
NRBC # BLD: 0 /100 WBCS — SIGNIFICANT CHANGE UP (ref 0–0)
PCO2 BLDA: 51 MMHG — HIGH (ref 38–42)
PCO2 BLDA: 62 MMHG — CRITICAL HIGH (ref 38–42)
PH BLDA: 7.44 — HIGH (ref 7.38–7.42)
PH BLDA: 7.5 — HIGH (ref 7.38–7.42)
PHOSPHATE SERPL-MCNC: 3.6 MG/DL — SIGNIFICANT CHANGE UP (ref 2.1–4.9)
PLATELET # BLD AUTO: 103 K/UL — LOW (ref 130–400)
PLATELET # BLD AUTO: 125 K/UL — LOW (ref 130–400)
PO2 BLDA: 102 MMHG — HIGH (ref 78–95)
PO2 BLDA: 103 MMHG — HIGH (ref 78–95)
POTASSIUM SERPL-MCNC: 3.2 MMOL/L — LOW (ref 3.5–5)
POTASSIUM SERPL-MCNC: 3.6 MMOL/L — SIGNIFICANT CHANGE UP (ref 3.5–5)
POTASSIUM SERPL-SCNC: 3.2 MMOL/L — LOW (ref 3.5–5)
POTASSIUM SERPL-SCNC: 3.6 MMOL/L — SIGNIFICANT CHANGE UP (ref 3.5–5)
PROT SERPL-MCNC: 6.7 G/DL — SIGNIFICANT CHANGE UP (ref 6–8)
PROTHROM AB SERPL-ACNC: 18.2 SEC — HIGH (ref 9.95–12.87)
RBC # BLD: 2.52 M/UL — LOW (ref 4.2–5.4)
RBC # BLD: 2.67 M/UL — LOW (ref 4.2–5.4)
RBC # FLD: 15.1 % — HIGH (ref 11.5–14.5)
RBC # FLD: 15.1 % — HIGH (ref 11.5–14.5)
SAO2 % BLDA: 99 % — HIGH (ref 94–98)
SAO2 % BLDA: 99 % — HIGH (ref 94–98)
SODIUM SERPL-SCNC: 137 MMOL/L — SIGNIFICANT CHANGE UP (ref 135–146)
SODIUM SERPL-SCNC: 138 MMOL/L — SIGNIFICANT CHANGE UP (ref 135–146)
TROPONIN T SERPL-MCNC: 0.05 NG/ML — CRITICAL HIGH
TYPE + AB SCN PNL BLD: SIGNIFICANT CHANGE UP
WBC # BLD: 14.38 K/UL — HIGH (ref 4.8–10.8)
WBC # BLD: 15.95 K/UL — HIGH (ref 4.8–10.8)
WBC # FLD AUTO: 14.38 K/UL — HIGH (ref 4.8–10.8)
WBC # FLD AUTO: 15.95 K/UL — HIGH (ref 4.8–10.8)

## 2018-06-24 RX ORDER — CEFEPIME 1 G/1
2000 INJECTION, POWDER, FOR SOLUTION INTRAMUSCULAR; INTRAVENOUS ONCE
Qty: 0 | Refills: 0 | Status: COMPLETED | OUTPATIENT
Start: 2018-06-24 | End: 2018-06-24

## 2018-06-24 RX ORDER — PROTHROMBIN COMPLEX CONCENTRATE (HUMAN) 25.5; 16.5; 24; 22; 22; 26 [IU]/ML; [IU]/ML; [IU]/ML; [IU]/ML; [IU]/ML; [IU]/ML
1750 POWDER, FOR SOLUTION INTRAVENOUS ONCE
Qty: 0 | Refills: 0 | Status: COMPLETED | OUTPATIENT
Start: 2018-06-24 | End: 2018-06-24

## 2018-06-24 RX ORDER — SODIUM CHLORIDE 5 G/100ML
500 INJECTION, SOLUTION INTRAVENOUS
Qty: 0 | Refills: 0 | Status: DISCONTINUED | OUTPATIENT
Start: 2018-06-24 | End: 2018-06-26

## 2018-06-24 RX ORDER — PANTOPRAZOLE SODIUM 20 MG/1
40 TABLET, DELAYED RELEASE ORAL
Qty: 0 | Refills: 0 | Status: DISCONTINUED | OUTPATIENT
Start: 2018-06-24 | End: 2018-06-25

## 2018-06-24 RX ORDER — VANCOMYCIN HCL 1 G
1000 VIAL (EA) INTRAVENOUS EVERY 24 HOURS
Qty: 0 | Refills: 0 | Status: DISCONTINUED | OUTPATIENT
Start: 2018-06-24 | End: 2018-06-25

## 2018-06-24 RX ORDER — POTASSIUM CHLORIDE 20 MEQ
20 PACKET (EA) ORAL ONCE
Qty: 0 | Refills: 0 | Status: COMPLETED | OUTPATIENT
Start: 2018-06-24 | End: 2018-06-24

## 2018-06-24 RX ORDER — CEFEPIME 1 G/1
2000 INJECTION, POWDER, FOR SOLUTION INTRAMUSCULAR; INTRAVENOUS EVERY 24 HOURS
Qty: 0 | Refills: 0 | Status: DISCONTINUED | OUTPATIENT
Start: 2018-06-25 | End: 2018-06-25

## 2018-06-24 RX ORDER — CEFEPIME 1 G/1
INJECTION, POWDER, FOR SOLUTION INTRAMUSCULAR; INTRAVENOUS
Qty: 0 | Refills: 0 | Status: DISCONTINUED | OUTPATIENT
Start: 2018-06-24 | End: 2018-06-25

## 2018-06-24 RX ORDER — SPIRONOLACTONE 25 MG/1
25 TABLET, FILM COATED ORAL DAILY
Qty: 0 | Refills: 0 | Status: DISCONTINUED | OUTPATIENT
Start: 2018-06-24 | End: 2018-06-26

## 2018-06-24 RX ORDER — ACETAZOLAMIDE 250 MG/1
250 TABLET ORAL EVERY 12 HOURS
Qty: 0 | Refills: 0 | Status: DISCONTINUED | OUTPATIENT
Start: 2018-06-24 | End: 2018-06-26

## 2018-06-24 RX ORDER — POTASSIUM CHLORIDE 20 MEQ
40 PACKET (EA) ORAL ONCE
Qty: 0 | Refills: 0 | Status: COMPLETED | OUTPATIENT
Start: 2018-06-24 | End: 2018-06-24

## 2018-06-24 RX ORDER — FUROSEMIDE 40 MG
40 TABLET ORAL
Qty: 0 | Refills: 0 | Status: DISCONTINUED | OUTPATIENT
Start: 2018-06-24 | End: 2018-06-26

## 2018-06-24 RX ORDER — IPRATROPIUM/ALBUTEROL SULFATE 18-103MCG
3 AEROSOL WITH ADAPTER (GRAM) INHALATION EVERY 6 HOURS
Qty: 0 | Refills: 0 | Status: DISCONTINUED | OUTPATIENT
Start: 2018-06-24 | End: 2018-06-25

## 2018-06-24 RX ADMIN — Medication 1 DROP(S): at 17:02

## 2018-06-24 RX ADMIN — Medication 3 MILLILITER(S): at 13:10

## 2018-06-24 RX ADMIN — CEFEPIME 100 MILLIGRAM(S): 1 INJECTION, POWDER, FOR SOLUTION INTRAMUSCULAR; INTRAVENOUS at 01:06

## 2018-06-24 RX ADMIN — APIXABAN 2.5 MILLIGRAM(S): 2.5 TABLET, FILM COATED ORAL at 06:30

## 2018-06-24 RX ADMIN — Medication 25 MILLIGRAM(S): at 06:38

## 2018-06-24 RX ADMIN — DORZOLAMIDE HYDROCHLORIDE 1 DROP(S): 20 SOLUTION/ DROPS OPHTHALMIC at 13:10

## 2018-06-24 RX ADMIN — Medication 40 MILLIGRAM(S): at 06:32

## 2018-06-24 RX ADMIN — Medication 3 MILLILITER(S): at 20:19

## 2018-06-24 RX ADMIN — PANTOPRAZOLE SODIUM 40 MILLIGRAM(S): 20 TABLET, DELAYED RELEASE ORAL at 06:31

## 2018-06-24 RX ADMIN — Medication 0.12 MILLIGRAM(S): at 06:30

## 2018-06-24 RX ADMIN — Medication 3 MILLILITER(S): at 01:21

## 2018-06-24 RX ADMIN — DORZOLAMIDE HYDROCHLORIDE 1 DROP(S): 20 SOLUTION/ DROPS OPHTHALMIC at 06:30

## 2018-06-24 RX ADMIN — Medication 40 MILLIGRAM(S): at 17:02

## 2018-06-24 RX ADMIN — Medication 50 MILLIEQUIVALENT(S): at 16:47

## 2018-06-24 RX ADMIN — Medication 50 MILLIEQUIVALENT(S): at 22:21

## 2018-06-24 RX ADMIN — Medication 250 MILLIGRAM(S): at 06:53

## 2018-06-24 RX ADMIN — Medication 100 MILLIGRAM(S): at 06:30

## 2018-06-24 RX ADMIN — DORZOLAMIDE HYDROCHLORIDE 1 DROP(S): 20 SOLUTION/ DROPS OPHTHALMIC at 22:17

## 2018-06-24 RX ADMIN — Medication 1 DROP(S): at 06:31

## 2018-06-24 RX ADMIN — SIMETHICONE 80 MILLIGRAM(S): 80 TABLET, CHEWABLE ORAL at 06:31

## 2018-06-24 RX ADMIN — Medication 3 MILLILITER(S): at 07:47

## 2018-06-24 RX ADMIN — LATANOPROST 1 DROP(S): 0.05 SOLUTION/ DROPS OPHTHALMIC; TOPICAL at 22:17

## 2018-06-24 RX ADMIN — SODIUM CHLORIDE 50 MILLILITER(S): 5 INJECTION, SOLUTION INTRAVENOUS at 23:38

## 2018-06-24 RX ADMIN — PROTHROMBIN COMPLEX CONCENTRATE (HUMAN) 400 INTERNATIONAL UNIT(S): 25.5; 16.5; 24; 22; 22; 26 POWDER, FOR SOLUTION INTRAVENOUS at 22:20

## 2018-06-24 NOTE — PROVIDER CONTACT NOTE (OTHER) - ACTION/TREATMENT ORDERED:
Will continue to monitor and assess. Per Dr. Pagan, spoke to medicine resident, states they will consider upgrading patient to ICU. Will continue to monitor and assess.

## 2018-06-24 NOTE — CHART NOTE - NSCHARTNOTEFT_GEN_A_CORE
77 yr old female with a PMH of HTN, type II DM, CAD, A-Fib (on Eliquis), HFpEF, endocarditis, COPD (on 2 L home O2), DVT, CKD, GERD, and depression who presented from SNF with progressively increasing SOB and bilateral lower extremity edema. In ED, patient requiring constant Bipap, sating 95%. BNP: 30,000. Patient responded well to Lasix IV 40mg q12; however she was still requiring bipap. For possible HCAP patient treated with Empiric Vanc, Cefepime, Levaquin. No wheeze on exam, steroid was not given. AF rate controlled with digoxin and metoprolol. Patient was also found to have CELY (Cr 2.0; baseline 1.5 on CKD likely secondary to cardiorenal syndrome. ABG showed metabolic alkalosis with partially compensating respiratory acidosis. Cardiology, Nephrology consults pending.  Check Vanco trough    Patient is full code. Jonathon Cross is health care proxy: [phone] (left a voicemail).  Dr. Morfin approved upgrade.

## 2018-06-24 NOTE — PROVIDER CONTACT NOTE (OTHER) - SITUATION
Dr. Pagan in to see patient, aware of patient's status. Patient on bipap, arouses to voice but remains lethargic. VSS.

## 2018-06-24 NOTE — CONSULT NOTE ADULT - ASSESSMENT
77 y.o F with acute to earlier subacute right-sided SDH extending into anterior and posterior falx and right cerebellar region 1.6 cm in size, with right to left midline shift of 6 mm.  On Eliquis.     Plan:  Case d/w Dr. Sullivan. OR tomorrow x craniotomy for evacuation of right SDH. Last dose of Eliquis was given today at 06:00, plan for OR in AM (at least 24 hours prior to the procedure since last Eliquis administration )  Keep NPO  Frequent neuro checks Q1H or as per ICU protocol, if worsening of neuro status call EDUAR PRICE stat.   Stop AC, reverse Eliquis. Pharmacy called no Andexxa available. As per ICU resident Kcentra will be started.  Start 3 %NS at 50cc/hr , Goal of Na is 155  Keep HOB at 30 degrees  Called Pt. Son, message left, will need consent x procedure.   Monitor BP  Monitor VS

## 2018-06-24 NOTE — CHART NOTE - NSCHARTNOTEFT_GEN_A_CORE
Pt CTH shows an acute right subdural hematoma with midline shift. Pt received Eliquis this morning at 6am last dose. Neurosurgery consulted and recommended Eliquis reversal, hold Eliquis, keep sodium 155, start 3% NS, neuro check q1hr. NPO after midnight for Craniotomy in am. Called son 3 times and left voicemail to get consent but no answer. Awaiting call back

## 2018-06-24 NOTE — CONSULT NOTE ADULT - SUBJECTIVE AND OBJECTIVE BOX
HISTORY OF PRESENT ILLNESS:   77 y.o F with PMH of DVT, COPD, RA, Glaucoma, CELY, CHF, Diabetes Mellitus, A. Fib on Eliquis( last dose  on 18 at 06:00) admitted to medicine with SOB and weakness brought from [facility]. As per Medical resident Pt. with AMS NSGY called to evaluate Pt. with AMS  and lethargic (unknown period of time) NSGY called for CTH preliminary findings of acute to early subacute right-sided subdural hematoma extending into the anterior and posterior falx and right cerebellar region 1.6 cm in size, with associated right to left midline shift of 6 mm and loss of sulci pattern within the right cerebellar hemisphere. Pt. denies HA, N/V. On BiPaP.  Pt. seen and examined at bedside ICU. Pt. daya Cross health care proxy called [phone], with no answer, message left. Pt. A&O x 2 (name and place), appears lethargic but arousable to voice, opens her eyes, following verbal commands. Moving RUE and RLE, withdraws to pain over LUE and LLE. GCS12      PAST MEDICAL & SURGICAL HISTORY:  MI, old  Endocarditis  Glaucoma  Diabetes mellitus, type 2  Anemia  Depression  Chronic obstructive pulmonary disease  Sepsis  Gastroesophageal reflux disease  Atrial fibrillation  Hypertension  Kidney failure  No significant past surgical history          Allergies: NKDA. chocolate (Unknown)     REVIEW OF SYSTEMS  negative for any major pulmonary, GI, cardiac, and psychiatric history other than those listed above.    MEDICATIONS:  Antibiotics:  cefepime   IVPB      levoFLOXacin IVPB 750 milliGRAM(s) IV Intermittent every 48 hours  vancomycin  IVPB 1000 milliGRAM(s) IV Intermittent every 24 hours    Anticoagulation:  prothrombin complex concentrate IVPB (KCENTRA) 1750 International Unit(s) IV Intermittent once    OTHER:  acetazolamide    Tablet 250 milliGRAM(s) Oral every 12 hours  ALBUTerol/ipratropium for Nebulization 3 milliLiter(s) Nebulizer every 6 hours  allopurinol 300 milliGRAM(s) Oral daily  atorvastatin 10 milliGRAM(s) Oral at bedtime  digoxin     Tablet 0.125 milliGRAM(s) Oral daily  docusate sodium 100 milliGRAM(s) Oral two times a day  dorzolamide 2% Ophthalmic Solution 1 Drop(s) Both EYES three times a day  furosemide   Injectable 40 milliGRAM(s) IV Push two times a day  latanoprost 0.005% Ophthalmic Solution 1 Drop(s) Both EYES at bedtime  metoprolol succinate ER 25 milliGRAM(s) Oral daily  pantoprazole    Tablet 40 milliGRAM(s) Oral before breakfast  senna 1 Tablet(s) Oral at bedtime  simethicone 80 milliGRAM(s) Chew three times a day  spironolactone 25 milliGRAM(s) Oral daily  timolol 0.25% Solution 1 Drop(s) Both EYES two times a day    IVF:  potassium chloride  20 mEq/100 mL IVPB 20 milliEquivalent(s) IV Intermittent once      Vital Signs Last 24 Hrs  T(C): 37.2 (2018 19:11), Max: 37.2 (2018 19:11)  T(F): 98.9 (2018 19:11), Max: 98.9 (2018 19:11)  HR: 90 (2018 19:11) (87 - 105)  BP: 112/51 (2018 19:11) (104/52 - 122/55)  RR: 14 (2018 19:11) (14 - 20)  SpO2: 97% (2018 19:11) (91% - 100%)    PHYSICAL EXAM:  General: A&O x 2, lethargic but arousable to verbal command, follows verbal commands  HEENT: NC/AT, opens eyes to voice, right eye pupil 3 mm brisk to light, left eye pupil 7 mm misshaped nonreactive to light, lens opacification on left. on BiPAP.  Abd: soft, NT/ND  Extremities: Moving RUE and RLE, withdraws to pain over LUE and LLE, sensations intact throughout.          LABS:                        8.4    15.95 )-----------( 103      ( 2018 07:59 )             26.7     06-24    137  |  87<L>  |  116<HH>  ----------------------------<  107<H>  3.2<L>   |  36<H>  |  2.0<H>    Ca    8.4<L>      2018 07:59  Phos  3.6     06-24  Mg     2.6     06-24    TPro  7.1  /  Alb  3.3<L>  /  TBili  0.7  /  DBili  x   /  AST  26  /  ALT  8   /  AlkPhos  75      PT/INR - ( 2018 19:33 )   PT: 25.60 sec;   INR: 2.33 ratio         PTT - ( 2018 19:33 )  PTT:33.4 sec  Urinalysis Basic - ( 2018 20:05 )    Color: Yellow / Appearance: Cloudy / S.015 / pH: x  Gluc: x / Ketone: Negative  / Bili: Negative / Urobili: 0.2 mg/dL   Blood: x / Protein: 100 mg/dL / Nitrite: Negative   Leuk Esterase: Large / RBC: 25-50 /HPF / WBC 26-50 /HPF   Sq Epi: x / Non Sq Epi: Few /HPF / Bacteria: Moderate /HPF      CULTURES:  Culture Results:   >100,000 CFU/ml Gram Negative Rods ( @ 20:05)      RADIOLOGY & ADDITIONAL STUDIES:  < from: CT Head No Cont (18 @ 20:44) >    ******PRELIMINARY REPORT******    ******PRELIMINARY REPORT******          EXAM:  CT BRAIN            PROCEDURE DATE:  2018          ******PRELIMINARY REPORT******    ******PRELIMINARY REPORT******          INTERPRETATION:  Clinical History / Reason for exam: Lethargy.    Technique: Multiple contiguous axial CT images of the head were obtained    from the base of the skull to the vertex without administration of   intravenous contrast. Coronal and sagittal reformats were obtained.    Comparison: CT head performed 2018.    Findings:     There is an acute to early subacute right sided subdural hematoma along   the right convexity measuring up to 1.6 cm with associated right to left   midline shift of 6 mm. The subdural hematoma extends along the anterior   and posterior falx and right cerebellar tentorium.    There is associated loss of sulcal pattern of the right cerebral   hemisphere. The left sulcal pattern are within normal limits for the   patient's stated age.      There are patchy areas of hypoattenuation in the periventricular and   subcortical white matter compatible with chronic microvascular ischemic   changes.    Grey-white differentiation is preserved.    The calvarium is intact.    The visualized paranasal sinuses and bilateral mastoid complexes are   unremarkable.     Impression:     Acute to early subacute right-sided subdural hematoma along the right   convexity and extending into the anterior and posterior falx and right   tentorium toward measuring up to 1.6 cm.    Associated right to left midline shift of 6 mm and loss of sulci pattern   within the right cerebral hemisphere.    < end of copied text >

## 2018-06-24 NOTE — PROVIDER CONTACT NOTE (OTHER) - SITUATION
Patient's potassium 3.2, PO potassium ordered for patient. Patient is lethargic on bipap, arouses to voice, currently unable to take PO medication.

## 2018-06-24 NOTE — PATIENT PROFILE ADULT. - FUNCTIONAL SCREEN CURRENT LEVEL: EATING, MLM
Dandre Car is a 2 month old female presenting  For well exam. Denies known Latex allergy or symptoms of Latex sensitivity. Takes no medications. HAs no known allergies.Child accompanied by father  Mother's work status: full time  FEEDING:      bottle      Frequency: 2 and 4 ounces , 8 / day  SLEEPING      Where: in crib /bassinet in parent's room      Position: back  STOOLS: 3 / day  GROWTH AND DEVELOPMENT TOPICS:      Eyes follow object - YES      Vocalizes - YES      Prone-lifts and turns head - YES      Responds to noise - YES  MEDICATIONS: Patient is not currently taking any medication  CONCERNS: Left leg twitches 2 to 3 times daily    
Presents for check up    Parental concerns per nurses note    Parents have reviewed the vaccine information provided at the initial check up, any/all questions addressed.  No one in a caretaking role is immune suppressed through illness or medication.    Social Hx:  Parents primary care givers.  Baby does receive outside care through .  Recent exposures to illness include none.    ROS:  Derm: no new moles, birthmarks, rashes  Resp: free of uri sx, no sneeze, cough, wheeze  Card:  no spells involving cyanosis, feeds without tiring  GI: passes bowel movements without struggle  Neuro: moves all extremities without restriction, obvious visual connection to parental faces, movement    PE:  Alert bright baby  HEENT: anterior fontanelle soft, non bulging.  conjugate eye movements with rr x 2, tms clear, oropharynx moist  Neck: supple with full rom  Chest: symmetric  Lungs: clear  Cardiac: normoactive precordium, regular rate/rhythm s1s2 without murmur. No central or peripheral cyanosis  Abdomen: active bowel sounds, soft without organomegaly or masses  Genital: Lamine 1   Extremities: Normal upper and lower extremities, hips free of clicks or subluxations  Neuro: normal tone and strength    Assess:  Well Child    Plan:  Parental concerns addressed  Anticipatory guidance patient handout provided with appropriate tylenal dosing  Immunizations today: Pentacel, Hep B, Prevnar, rotavirus   child health form completed.  RV prn or at 4 mo old for next cce              
Vaccine Information Statement(s) was given today. This has been reviewed, questions answered, and verbal consent given by Parent for injection(s) and administration of Multi Vaccines between birth and 6 months, Pneumococcal Conjugate (PCV13) and Rotavirus.    
(4) completely dependent

## 2018-06-25 DIAGNOSIS — Z02.9 ENCOUNTER FOR ADMINISTRATIVE EXAMINATIONS, UNSPECIFIED: ICD-10-CM

## 2018-06-25 LAB
-  AMIKACIN: SIGNIFICANT CHANGE UP
-  AMPICILLIN/SULBACTAM: SIGNIFICANT CHANGE UP
-  AMPICILLIN: SIGNIFICANT CHANGE UP
-  AZTREONAM: SIGNIFICANT CHANGE UP
-  CEFAZOLIN: SIGNIFICANT CHANGE UP
-  CEFEPIME: SIGNIFICANT CHANGE UP
-  CEFOXITIN: SIGNIFICANT CHANGE UP
-  CEFTRIAXONE: SIGNIFICANT CHANGE UP
-  CIPROFLOXACIN: SIGNIFICANT CHANGE UP
-  ERTAPENEM: SIGNIFICANT CHANGE UP
-  GENTAMICIN: SIGNIFICANT CHANGE UP
-  IMIPENEM: SIGNIFICANT CHANGE UP
-  LEVOFLOXACIN: SIGNIFICANT CHANGE UP
-  MEROPENEM: SIGNIFICANT CHANGE UP
-  NITROFURANTOIN: SIGNIFICANT CHANGE UP
-  PIPERACILLIN/TAZOBACTAM: SIGNIFICANT CHANGE UP
-  TIGECYCLINE: SIGNIFICANT CHANGE UP
-  TOBRAMYCIN: SIGNIFICANT CHANGE UP
-  TRIMETHOPRIM/SULFAMETHOXAZOLE: SIGNIFICANT CHANGE UP
ANION GAP SERPL CALC-SCNC: 12 MMOL/L — SIGNIFICANT CHANGE UP (ref 7–14)
ANION GAP SERPL CALC-SCNC: 15 MMOL/L — HIGH (ref 7–14)
ANION GAP SERPL CALC-SCNC: 16 MMOL/L — HIGH (ref 7–14)
ANION GAP SERPL CALC-SCNC: 18 MMOL/L — HIGH (ref 7–14)
APTT BLD: 30.1 SEC — SIGNIFICANT CHANGE UP (ref 27–39.2)
BASE EXCESS BLDA CALC-SCNC: 14.2 MMOL/L — HIGH (ref -2–2)
BASE EXCESS BLDA CALC-SCNC: 15 MMOL/L — HIGH (ref -2–2)
BASE EXCESS BLDA CALC-SCNC: 15.3 MMOL/L — HIGH (ref -2–2)
BASOPHILS # BLD AUTO: 0.02 K/UL — SIGNIFICANT CHANGE UP (ref 0–0.2)
BASOPHILS NFR BLD AUTO: 0.2 % — SIGNIFICANT CHANGE UP (ref 0–1)
BUN SERPL-MCNC: 105 MG/DL — CRITICAL HIGH (ref 10–20)
BUN SERPL-MCNC: 109 MG/DL — CRITICAL HIGH (ref 10–20)
BUN SERPL-MCNC: 115 MG/DL — CRITICAL HIGH (ref 10–20)
BUN SERPL-MCNC: 117 MG/DL — CRITICAL HIGH (ref 10–20)
BUN SERPL-MCNC: 118 MG/DL — CRITICAL HIGH (ref 10–20)
CALCIUM SERPL-MCNC: 8.7 MG/DL — SIGNIFICANT CHANGE UP (ref 8.5–10.1)
CALCIUM SERPL-MCNC: 8.9 MG/DL — SIGNIFICANT CHANGE UP (ref 8.5–10.1)
CHLORIDE SERPL-SCNC: 100 MMOL/L — SIGNIFICANT CHANGE UP (ref 98–110)
CHLORIDE SERPL-SCNC: 88 MMOL/L — LOW (ref 98–110)
CHLORIDE SERPL-SCNC: 89 MMOL/L — LOW (ref 98–110)
CHLORIDE SERPL-SCNC: 96 MMOL/L — LOW (ref 98–110)
CK MB CFR SERPL CALC: <1 NG/ML — SIGNIFICANT CHANGE UP (ref 0.6–6.3)
CK SERPL-CCNC: 18 U/L — SIGNIFICANT CHANGE UP (ref 0–225)
CO2 SERPL-SCNC: 33 MMOL/L — HIGH (ref 17–32)
CO2 SERPL-SCNC: 36 MMOL/L — HIGH (ref 17–32)
CO2 SERPL-SCNC: 36 MMOL/L — HIGH (ref 17–32)
CO2 SERPL-SCNC: 37 MMOL/L — HIGH (ref 17–32)
CREAT SERPL-MCNC: 1.7 MG/DL — HIGH (ref 0.7–1.5)
CREAT SERPL-MCNC: 1.8 MG/DL — HIGH (ref 0.7–1.5)
CREAT SERPL-MCNC: 1.9 MG/DL — HIGH (ref 0.7–1.5)
CREAT SERPL-MCNC: 1.9 MG/DL — HIGH (ref 0.7–1.5)
CULTURE RESULTS: SIGNIFICANT CHANGE UP
EOSINOPHIL # BLD AUTO: 0.02 K/UL — SIGNIFICANT CHANGE UP (ref 0–0.7)
EOSINOPHIL NFR BLD AUTO: 0.2 % — SIGNIFICANT CHANGE UP (ref 0–8)
GLUCOSE SERPL-MCNC: 106 MG/DL — HIGH (ref 70–99)
GLUCOSE SERPL-MCNC: 121 MG/DL — HIGH (ref 70–99)
GLUCOSE SERPL-MCNC: 122 MG/DL — HIGH (ref 70–99)
GLUCOSE SERPL-MCNC: 133 MG/DL — HIGH (ref 70–99)
HCO3 BLDA-SCNC: 37 MMOL/L — HIGH (ref 23–27)
HCO3 BLDA-SCNC: 37 MMOL/L — HIGH (ref 23–27)
HCO3 BLDA-SCNC: 39 MMOL/L — HIGH (ref 23–27)
HCT VFR BLD CALC: 29 % — LOW (ref 37–47)
HGB BLD-MCNC: 9 G/DL — LOW (ref 12–16)
HOROWITZ INDEX BLDA+IHG-RTO: 100 — SIGNIFICANT CHANGE UP
HOROWITZ INDEX BLDA+IHG-RTO: 40 — SIGNIFICANT CHANGE UP
IMM GRANULOCYTES NFR BLD AUTO: 0.4 % — HIGH (ref 0.1–0.3)
INR BLD: 1.44 RATIO — HIGH (ref 0.65–1.3)
LACTATE SERPL-SCNC: 1.3 MMOL/L — SIGNIFICANT CHANGE UP (ref 0.5–2.2)
LYMPHOCYTES # BLD AUTO: 0.75 K/UL — LOW (ref 1.2–3.4)
LYMPHOCYTES # BLD AUTO: 5.9 % — LOW (ref 20.5–51.1)
MAGNESIUM SERPL-MCNC: 2.7 MG/DL — HIGH (ref 1.8–2.4)
MCHC RBC-ENTMCNC: 31 G/DL — LOW (ref 32–37)
MCHC RBC-ENTMCNC: 33.1 PG — HIGH (ref 27–31)
MCV RBC AUTO: 106.6 FL — HIGH (ref 81–99)
METHOD TYPE: SIGNIFICANT CHANGE UP
MONOCYTES # BLD AUTO: 0.56 K/UL — SIGNIFICANT CHANGE UP (ref 0.1–0.6)
MONOCYTES NFR BLD AUTO: 4.4 % — SIGNIFICANT CHANGE UP (ref 1.7–9.3)
NEUTROPHILS # BLD AUTO: 11.27 K/UL — HIGH (ref 1.4–6.5)
NEUTROPHILS NFR BLD AUTO: 88.9 % — HIGH (ref 42.2–75.2)
NRBC # BLD: 0 /100 WBCS — SIGNIFICANT CHANGE UP (ref 0–0)
ORGANISM # SPEC MICROSCOPIC CNT: SIGNIFICANT CHANGE UP
ORGANISM # SPEC MICROSCOPIC CNT: SIGNIFICANT CHANGE UP
PCO2 BLDA: 34 MMHG — LOW (ref 38–42)
PCO2 BLDA: 37 MMHG — LOW (ref 38–42)
PCO2 BLDA: 48 MMHG — HIGH (ref 38–42)
PH BLDA: 7.52 — HIGH (ref 7.38–7.42)
PH BLDA: 7.6 — HIGH (ref 7.38–7.42)
PH BLDA: 7.64 — CRITICAL HIGH (ref 7.38–7.42)
PLATELET # BLD AUTO: 123 K/UL — LOW (ref 130–400)
PO2 BLDA: 104 MMHG — HIGH (ref 78–95)
PO2 BLDA: 253 MMHG — HIGH (ref 78–95)
PO2 BLDA: 74 MMHG — LOW (ref 78–95)
POTASSIUM SERPL-MCNC: 2.9 MMOL/L — LOW (ref 3.5–5)
POTASSIUM SERPL-MCNC: 3 MMOL/L — LOW (ref 3.5–5)
POTASSIUM SERPL-MCNC: 3.4 MMOL/L — LOW (ref 3.5–5)
POTASSIUM SERPL-MCNC: 3.6 MMOL/L — SIGNIFICANT CHANGE UP (ref 3.5–5)
POTASSIUM SERPL-SCNC: 2.9 MMOL/L — LOW (ref 3.5–5)
POTASSIUM SERPL-SCNC: 3 MMOL/L — LOW (ref 3.5–5)
POTASSIUM SERPL-SCNC: 3.4 MMOL/L — LOW (ref 3.5–5)
POTASSIUM SERPL-SCNC: 3.6 MMOL/L — SIGNIFICANT CHANGE UP (ref 3.5–5)
PROTHROM AB SERPL-ACNC: 15.7 SEC — HIGH (ref 9.95–12.87)
RBC # BLD: 2.72 M/UL — LOW (ref 4.2–5.4)
RBC # FLD: 15.2 % — HIGH (ref 11.5–14.5)
SAO2 % BLDA: 100 % — HIGH (ref 94–98)
SAO2 % BLDA: 101 % — HIGH (ref 94–98)
SAO2 % BLDA: 98 % — SIGNIFICANT CHANGE UP (ref 94–98)
SODIUM SERPL-SCNC: 137 MMOL/L — SIGNIFICANT CHANGE UP (ref 135–146)
SODIUM SERPL-SCNC: 140 MMOL/L — SIGNIFICANT CHANGE UP (ref 135–146)
SODIUM SERPL-SCNC: 148 MMOL/L — HIGH (ref 135–146)
SODIUM SERPL-SCNC: 151 MMOL/L — HIGH (ref 135–146)
SPECIMEN SOURCE: SIGNIFICANT CHANGE UP
TROPONIN T SERPL-MCNC: 0.05 NG/ML — CRITICAL HIGH
TSH SERPL-MCNC: 1.27 UIU/ML — SIGNIFICANT CHANGE UP (ref 0.27–4.2)
WBC # BLD: 12.67 K/UL — HIGH (ref 4.8–10.8)
WBC # FLD AUTO: 12.67 K/UL — HIGH (ref 4.8–10.8)

## 2018-06-25 PROCEDURE — 93970 EXTREMITY STUDY: CPT | Mod: 26

## 2018-06-25 RX ORDER — CHLORHEXIDINE GLUCONATE 213 G/1000ML
15 SOLUTION TOPICAL
Qty: 0 | Refills: 0 | Status: DISCONTINUED | OUTPATIENT
Start: 2018-06-25 | End: 2018-06-26

## 2018-06-25 RX ORDER — IPRATROPIUM BROMIDE 0.2 MG/ML
1 SOLUTION, NON-ORAL INHALATION EVERY 6 HOURS
Qty: 0 | Refills: 0 | Status: DISCONTINUED | OUTPATIENT
Start: 2018-06-25 | End: 2018-06-25

## 2018-06-25 RX ORDER — NOREPINEPHRINE BITARTRATE/D5W 8 MG/250ML
0.05 PLASTIC BAG, INJECTION (ML) INTRAVENOUS
Qty: 8 | Refills: 0 | Status: DISCONTINUED | OUTPATIENT
Start: 2018-06-25 | End: 2018-06-26

## 2018-06-25 RX ORDER — PANTOPRAZOLE SODIUM 20 MG/1
40 TABLET, DELAYED RELEASE ORAL DAILY
Qty: 0 | Refills: 0 | Status: DISCONTINUED | OUTPATIENT
Start: 2018-06-25 | End: 2018-06-26

## 2018-06-25 RX ORDER — VANCOMYCIN HCL 1 G
1000 VIAL (EA) INTRAVENOUS EVERY 12 HOURS
Qty: 0 | Refills: 0 | Status: DISCONTINUED | OUTPATIENT
Start: 2018-06-25 | End: 2018-06-25

## 2018-06-25 RX ORDER — MEROPENEM 1 G/30ML
1000 INJECTION INTRAVENOUS ONCE
Qty: 0 | Refills: 0 | Status: COMPLETED | OUTPATIENT
Start: 2018-06-25 | End: 2018-06-25

## 2018-06-25 RX ORDER — MEROPENEM 1 G/30ML
INJECTION INTRAVENOUS
Qty: 0 | Refills: 0 | Status: DISCONTINUED | OUTPATIENT
Start: 2018-06-25 | End: 2018-06-26

## 2018-06-25 RX ORDER — IPRATROPIUM/ALBUTEROL SULFATE 18-103MCG
3 AEROSOL WITH ADAPTER (GRAM) INHALATION EVERY 6 HOURS
Qty: 0 | Refills: 0 | Status: DISCONTINUED | OUTPATIENT
Start: 2018-06-25 | End: 2018-06-26

## 2018-06-25 RX ORDER — CEFEPIME 1 G/1
2000 INJECTION, POWDER, FOR SOLUTION INTRAMUSCULAR; INTRAVENOUS EVERY 24 HOURS
Qty: 0 | Refills: 0 | Status: DISCONTINUED | OUTPATIENT
Start: 2018-06-25 | End: 2018-06-25

## 2018-06-25 RX ORDER — LEVETIRACETAM 250 MG/1
500 TABLET, FILM COATED ORAL EVERY 12 HOURS
Qty: 0 | Refills: 0 | Status: DISCONTINUED | OUTPATIENT
Start: 2018-06-25 | End: 2018-06-26

## 2018-06-25 RX ORDER — MEROPENEM 1 G/30ML
1000 INJECTION INTRAVENOUS EVERY 12 HOURS
Qty: 0 | Refills: 0 | Status: DISCONTINUED | OUTPATIENT
Start: 2018-06-25 | End: 2018-06-26

## 2018-06-25 RX ORDER — MIDAZOLAM HYDROCHLORIDE 1 MG/ML
0.02 INJECTION, SOLUTION INTRAMUSCULAR; INTRAVENOUS
Qty: 100 | Refills: 0 | Status: DISCONTINUED | OUTPATIENT
Start: 2018-06-25 | End: 2018-06-26

## 2018-06-25 RX ORDER — POTASSIUM CHLORIDE 20 MEQ
20 PACKET (EA) ORAL
Qty: 0 | Refills: 0 | Status: COMPLETED | OUTPATIENT
Start: 2018-06-25 | End: 2018-06-25

## 2018-06-25 RX ORDER — INSULIN LISPRO 100/ML
3 VIAL (ML) SUBCUTANEOUS ONCE
Qty: 0 | Refills: 0 | Status: COMPLETED | OUTPATIENT
Start: 2018-06-25 | End: 2018-06-25

## 2018-06-25 RX ORDER — VANCOMYCIN HCL 1 G
1000 VIAL (EA) INTRAVENOUS EVERY 24 HOURS
Qty: 0 | Refills: 0 | Status: DISCONTINUED | OUTPATIENT
Start: 2018-06-25 | End: 2018-06-25

## 2018-06-25 RX ORDER — LEVETIRACETAM 250 MG/1
1000 TABLET, FILM COATED ORAL ONCE
Qty: 0 | Refills: 0 | Status: COMPLETED | OUTPATIENT
Start: 2018-06-25 | End: 2018-06-25

## 2018-06-25 RX ORDER — ALBUTEROL 90 UG/1
2 AEROSOL, METERED ORAL EVERY 6 HOURS
Qty: 0 | Refills: 0 | Status: DISCONTINUED | OUTPATIENT
Start: 2018-06-25 | End: 2018-06-25

## 2018-06-25 RX ORDER — LEVETIRACETAM 250 MG/1
500 TABLET, FILM COATED ORAL
Qty: 0 | Refills: 0 | Status: DISCONTINUED | OUTPATIENT
Start: 2018-06-25 | End: 2018-06-25

## 2018-06-25 RX ORDER — DOCUSATE SODIUM 100 MG
100 CAPSULE ORAL
Qty: 0 | Refills: 0 | Status: DISCONTINUED | OUTPATIENT
Start: 2018-06-25 | End: 2018-06-26

## 2018-06-25 RX ORDER — LEVETIRACETAM 250 MG/1
1000 TABLET, FILM COATED ORAL ONCE
Qty: 0 | Refills: 0 | Status: DISCONTINUED | OUTPATIENT
Start: 2018-06-25 | End: 2018-06-25

## 2018-06-25 RX ADMIN — Medication 100 MILLIGRAM(S): at 19:29

## 2018-06-25 RX ADMIN — DORZOLAMIDE HYDROCHLORIDE 1 DROP(S): 20 SOLUTION/ DROPS OPHTHALMIC at 05:08

## 2018-06-25 RX ADMIN — Medication 3 MILLILITER(S): at 02:54

## 2018-06-25 RX ADMIN — DORZOLAMIDE HYDROCHLORIDE 1 DROP(S): 20 SOLUTION/ DROPS OPHTHALMIC at 21:56

## 2018-06-25 RX ADMIN — Medication 1 DROP(S): at 05:09

## 2018-06-25 RX ADMIN — Medication 250 MILLIGRAM(S): at 05:53

## 2018-06-25 RX ADMIN — Medication 50 MILLIEQUIVALENT(S): at 23:39

## 2018-06-25 RX ADMIN — DORZOLAMIDE HYDROCHLORIDE 1 DROP(S): 20 SOLUTION/ DROPS OPHTHALMIC at 14:58

## 2018-06-25 RX ADMIN — LATANOPROST 1 DROP(S): 0.05 SOLUTION/ DROPS OPHTHALMIC; TOPICAL at 21:55

## 2018-06-25 RX ADMIN — MEROPENEM 100 MILLIGRAM(S): 1 INJECTION INTRAVENOUS at 19:29

## 2018-06-25 RX ADMIN — SENNA PLUS 1 TABLET(S): 8.6 TABLET ORAL at 21:53

## 2018-06-25 RX ADMIN — CHLORHEXIDINE GLUCONATE 15 MILLILITER(S): 213 SOLUTION TOPICAL at 19:30

## 2018-06-25 RX ADMIN — LEVETIRACETAM 420 MILLIGRAM(S): 250 TABLET, FILM COATED ORAL at 19:29

## 2018-06-25 RX ADMIN — Medication 3 UNIT(S): at 05:07

## 2018-06-25 RX ADMIN — LEVETIRACETAM 400 MILLIGRAM(S): 250 TABLET, FILM COATED ORAL at 06:38

## 2018-06-25 RX ADMIN — ACETAZOLAMIDE 250 MILLIGRAM(S): 250 TABLET ORAL at 19:29

## 2018-06-25 RX ADMIN — Medication 1 DROP(S): at 19:28

## 2018-06-25 RX ADMIN — ATORVASTATIN CALCIUM 10 MILLIGRAM(S): 80 TABLET, FILM COATED ORAL at 21:55

## 2018-06-25 RX ADMIN — PANTOPRAZOLE SODIUM 40 MILLIGRAM(S): 20 TABLET, DELAYED RELEASE ORAL at 14:24

## 2018-06-25 RX ADMIN — Medication 50 MILLIEQUIVALENT(S): at 19:29

## 2018-06-25 RX ADMIN — Medication 40 MILLIGRAM(S): at 19:30

## 2018-06-25 RX ADMIN — MEROPENEM 100 MILLIGRAM(S): 1 INJECTION INTRAVENOUS at 05:15

## 2018-06-25 RX ADMIN — Medication 7.36 MICROGRAM(S)/KG/MIN: at 01:29

## 2018-06-25 RX ADMIN — CHLORHEXIDINE GLUCONATE 15 MILLILITER(S): 213 SOLUTION TOPICAL at 05:53

## 2018-06-25 RX ADMIN — Medication 300 MILLIGRAM(S): at 14:23

## 2018-06-25 NOTE — CONSULT NOTE ADULT - ASSESSMENT
76 y/o F w/ PMHX of HTN, DMII, CAD, AFib on eliquis, HFpEF, COPD (on 2L home O2),  DVT, CKD, GERD and depression p/w worsening SOB and b/L LE edema. Cardiology consulted for CHF exacerbation.    #Acute on chronic hypoxic Respiratory Failure 2/2 HFpEF  -BNP 30,000s, CE 0.07-->0.05, hypoxic and b/l LE edema  -Increase Lasix, bumex and metolazone on hold  -f/u echo      #Afib  -rate controlled    #HTN  - controlled

## 2018-06-25 NOTE — PROGRESS NOTE ADULT - ATTENDING COMMENTS
The patient is seen and examined at bedside.  Patient has been off of Versed since 8 AM.  Patient was examined at approximately 1 PM.  On examination, the patient's right pupil is 5 mm and nonreactive.  Left pupil is approximately 2-3 mm and sluggishly reactive.  The patient extends left upper and left lower extremity to noxious stimuli.  The patient  flexes right upper extremity to noxious stimuli and withdraws right lower extremity to noxious stimuli.  Patient does not follow commands.  She does not open eyes to voice.    Given patient's examination, and per review chart and discussion held by Dr. Sullivan and the patient's son, overall prognosis is poor with prolonged course should the patient undergo any craniotomy for evacuation of subdural hematoma.  Given her underlying comorbidities, likely she will require prolonged rehabilitative course her to and if she ever returns to her baseline.
I had a very jack discussion with the patient's son regarding prognosis and expectations.  He relayed that his mother would never want to be dependent on supportive care.  He therefore decided to treat conservatively and medically.

## 2018-06-25 NOTE — PROGRESS NOTE ADULT - ASSESSMENT
Patient is a 77y old Female who presents with a chief complaint of Progressive SOB and lower extremity edema. Currently hemodynamically stable in intubated and sedated. she has S Patient is a 77y old Female who presents with a chief complaint of Progressive SOB and lower extremity edema. Currently hemodynamically stable in intubated and sedated. In ICU Unresponsive, MV, on sedation  with Patient is a 77y old Female who presents with a chief complaint of Progressive SOB and lower extremity edema. Currently hemodynamically stable in intubated and sedated. In ICU Unresponsive, MV, on sedation  with 100 Mg of Versed and pressure maintained with 8 mg drip levophed. Patient is a 77y old Female with extensive pmh who presented with a chief complaint of Progressive SOB and lower extremity edema. Pt. comes from nursing home s/p fall on Eliquis 2.5mg. CT  head showed subacute right-sided SDH extending into anterior and posterior falx and right cerebellar region 1.6 cm in size, with right to left midline shift of 6 mm. Currently hemodynamically stable in intubated and sedated. In ICU Unresponsive, MV, on sedation  with 100 Mg of Versed and pressure maintained with 8 mg drip levophed. Right eye fixed and dilated. Patient poor prognosis.     Impression:   SDH with rt to left shift  AHRF      CNS:  -Versed c/w sedation  -CT head n/c  -Keppra 500mg BID  - REEG  - f/u Neurosurg  -Keep head above 45 degrees      CVS:   - c/w Levophed c/w   - c/w digoxin .125 mg    - c/w atorvastatin   ID:  - f/u Cx  -C/w Meropenem IV 1000 mg    Renal:  -f/u lytes q4  - keep Na+ 155-----c/w 3% sodium chloride  - c/w Lasix 40mg    DVT prfx:  - scd    GI:   - c/w Protonix  40 mg pfx

## 2018-06-25 NOTE — PROGRESS NOTE ADULT - SUBJECTIVE AND OBJECTIVE BOX
77 y.o F with right sided SDH. NSGY called for neuro exam change at 3:20 AM  with Right Pupil Change " fixed and dilated". Pt. seen and examined at bedside at 3:30 AM. Pt. right pupil 7 mm fixed and nonreactive to light. Pt. withdraws to painful stimuli B/L UE, intermittently responds and answers to question what is your name " Hannah" appears more lethargic., less arousable  and not following verbal commands.  Pt. son Tay Stephens called again [phone], answered spoke with Dr. Sullivan. As per Dr. Sullivan risk, benifets and prognosis of the pt. were discussed. Pt's Son decide not to proceed with surgery.   Case d/w Dr. Sullivan , Dr. Thurman, ICU resident. Dr. Sullivan will F/U.          Vital Signs Last 24 Hrs  T(C): 36.6 (2018 21:30), Max: 37.2 (2018 19:11)  T(F): 97.9 (2018 21:30), Max: 98.9 (2018 19:11)  HR: 88 (2018 03:30) (78 - 99)  BP: 124/53 (2018 03:30) (80/36 - 124/53)  BP(mean): 86 (2018 03:30) (49 - 86)  RR: 20 (2018 03:30) (14 - 24)  SpO2: 97% (2018 03:30) (95% - 100%)      PE:  General: Lethargic, intermittently responds and answers to question what is your name " Hannah" appears more lethargic., less arousable  and not following verbal commands.  HEENT: B/L 7 mm fix and dilated.  Extremities: Moves B/L UE to painful stimuli, withdraws to pain B/L LE.         18 @ 07:01  -  18 @ 07:00  --------------------------------------------------------  OUT: 1015     18 @ 07:01  -  18 @ 03:48  --------------------------------------------------------  OUT: 695 mL        LABS:                        8.7    14.38 )-----------( 125      ( 2018 21:09 )             28.3         137  |  88<L>  |  117<HH>  ----------------------------<  106<H>  3.4<L>   |  37<H>  |  1.9<H>    Ca    8.7      2018 00:34  Phos  3.6       Mg     2.6         TPro  6.7  /  Alb  3.1<L>  /  TBili  0.7  /  DBili  x   /  AST  17  /  ALT  7   /  AlkPhos  70      Urinalysis Basic - ( 2018 20:05 )    Color: Yellow / Appearance: Cloudy / S.015 / pH: x  Gluc: x / Ketone: Negative  / Bili: Negative / Urobili: 0.2 mg/dL   Blood: x / Protein: 100 mg/dL / Nitrite: Negative   Leuk Esterase: Large / RBC: 25-50 /HPF / WBC 26-50 /HPF   Sq Epi: x / Non Sq Epi: Few /HPF / Bacteria: Moderate /HPF

## 2018-06-25 NOTE — PROVIDER CONTACT NOTE (OTHER) - SITUATION
at 0300 Q1hr Neuro check, pt unarousable, pupils 4 Fixed. Not responding to sternal rub. Localizing only on Rt side

## 2018-06-25 NOTE — CONSULT NOTE ADULT - ASSESSMENT
78 y/o F w/ PMHX of HTN, DMII, CAD, AFib on eliquis, HFpEF, endocarditis, COPD (on 2L home O2),  DVT, CKD, GERF and depression p/2 with worsening SOB and b/L LE edema. Cardiology consulted for CHF exacerbation.    #Acute on chronicn hypoxic Respiratory Failure 2/2 HFpEF  -BNP 30,000s, CE 0.07-->0.05, Hypoxic and b/l LE edema  -Increase Lasix. Spiranolactone? Bumex and metalazone on hold  -f/u echo  -monitor I/O and wieght changes  -Fluid restriction    #Afib  -rate controlled    #HTN- controlled 76 y/o F w/ PMHX of HTN, DMII, CAD, AFib on eliquis, HFpEF, endocarditis, COPD (on 2L home O2),  DVT, CKD, GERD and depression p/w worsening SOB and b/L LE edema. Cardiology consulted for CHF exacerbation.    #Acute on chronicn hypoxic Respiratory Failure 2/2 HFpEF  -BNP 30,000s, CE 0.07-->0.05, Hypoxic and b/l LE edema  -Increase Lasix. Spiranolactone? Bumex and metalazone on hold  -f/u echo  -monitor I/O and wieght changes  -Fluid restriction    #Afib  -rate controlled    #HTN- controlled

## 2018-06-25 NOTE — CONSULT NOTE ADULT - SUBJECTIVE AND OBJECTIVE BOX
HPI: 77-year-old female with a PMH of HTN, type II DM, CAD, A-Fib (on Eliquis), HFpEF, COPD (on 2 L home O2), DVT, CKD, GERD, and depression who presented from SNF with progressively increasing SOB and bilateral lower extremity edema. History was obtained from the patient's son who stated that over the past 3 weeks he noted his mother to have increased swelling of her legs. The patient was brought to the ED on 6/20 where she received diuretic therapy, Frazier catheter placement, and was discharged back to SNF. However, the patient continued to experience SOB and was found to be lethargic. Upon current presentation, the patient was noted to be hypotensive, tachycardic, tachypneic, and hypoxic. She was placed on a NRB mask and subsequently switched to a venturi mask where her SpO2 was ~93%. She received nebulizer treatment, Lasix 40 mg IV, Zosyn, Vancomycin, and Azithromycin in the ED.    At baseline, the patient is alert and able to ambulate at her SNF. Her son states that she has been evaluated in the hospital three times this year for similar complaints and has received increased amounts of Lasix. (23 Jun 2018 22:16)    ECHO: EF 50%, Severe AI/AS      PAST MEDICAL & SURGICAL HISTORY:  MI, old  Endocarditis  Glaucoma  Diabetes mellitus, type 2  Anemia  Depression  Chronic obstructive pulmonary disease  Sepsis  Gastroesophageal reflux disease  Atrial fibrillation  Hypertension  Kidney failure  No significant past surgical history      Allergies  chocolate (Unknown)  No Known Drug Allergies      Home Medications:  allopurinol 300 mg oral tablet: 1 tab(s) orally once a day (20 Jun 2018 15:31)  atorvastatin 10 mg oral tablet: 1 tab(s) orally once a day (20 Jun 2018 15:31)  Azopt 1% ophthalmic suspension: 1 drop(s) to each affected eye 3 times a day (20 Jun 2018 15:31)  bumetanide 1 mg oral tablet: 1 tab(s) orally once a day (20 Jun 2018 15:31)  Colace 100 mg oral capsule: 1 cap(s) orally 2 times a day (20 Jun 2018 15:31)  Combivent 18 mcg-103 mcg-/inh inhalation aerosol:  (20 Jun 2018 15:31)  digoxin 125 mcg (0.125 mg) oral tablet: 1 tab(s) orally once a day (20 Jun 2018 15:31)  Eliquis 2.5 mg oral tablet: 1 tab(s) orally 2 times a day (20 Jun 2018 15:31)  ferrous sulfate 325 mg (65 mg elemental iron) oral tablet: 1 tab(s) orally 3 times a day (20 Jun 2018 15:31)  furosemide 40 mg oral tablet: 1 tab(s) orally once a day (20 Jun 2018 15:31)  latanoprost 0.005% ophthalmic solution: 1 drop(s) to each affected eye once a day (in the evening) (20 Jun 2018 15:31)  magnesium oxide 500 mg oral tablet: 1 tab(s) orally once a day (20 Jun 2018 15:31)  metOLazone 5 mg oral tablet: 1 tab(s) orally once a day (20 Jun 2018 15:31)  Metoprolol Succinate ER 25 mg oral tablet, extended release: 1 tab(s) orally once a day (20 Jun 2018 15:31)  oxycodone-acetaminophen 5 mg-300 mg oral tablet: 1 tab(s) orally every 6 hours (20 Jun 2018 15:31)  Procrit 2000 units/mL injectable solution:  (20 Jun 2018 15:31)  Senna 8.6 mg oral tablet: 1 tab(s) orally once a day (at bedtime) (20 Jun 2018 15:31)  sildenafil 20 mg oral tablet: 1 tab(s) orally 3 times a day (20 Jun 2018 15:31)  simethicone 80 mg oral tablet: 1 tab(s) orally 3 times a day (after meals) (20 Jun 2018 15:31)  timolol hemihydrate 0.5% ophthalmic solution: 1 drop(s) to each affected eye 2 times a day (20 Jun 2018 15:31)  Vitamin C 500 mg oral tablet: 1 tab(s) orally once a day (20 Jun 2018 15:31)  zinc sulfate 220 mg oral capsule: 1 cap(s) orally 3 times a day (20 Jun 2018 15:31)      MEDICATIONS  (STANDING):  acetazolamide    Tablet 250 milliGRAM(s) Oral every 12 hours  ALBUTerol    90 MICROgram(s) HFA Inhaler 2 Puff(s) Inhalation every 6 hours  allopurinol 300 milliGRAM(s) Oral daily  atorvastatin 10 milliGRAM(s) Oral at bedtime  chlorhexidine 0.12% Liquid 15 milliLiter(s) Swish and Spit two times a day  digoxin     Tablet 0.125 milliGRAM(s) Oral daily  docusate sodium 100 milliGRAM(s) Oral two times a day  dorzolamide 2% Ophthalmic Solution 1 Drop(s) Both EYES three times a day  furosemide   Injectable 40 milliGRAM(s) IV Push two times a day  ipratropium 17 MICROgram(s) HFA Inhaler 1 Puff(s) Inhalation every 6 hours  latanoprost 0.005% Ophthalmic Solution 1 Drop(s) Both EYES at bedtime  levETIRAcetam  IVPB 500 milliGRAM(s) IV Intermittent every 12 hours  meropenem  IVPB      meropenem  IVPB 1000 milliGRAM(s) IV Intermittent every 12 hours  metoprolol succinate ER 25 milliGRAM(s) Oral daily  midazolam Infusion 0.02 mG/kG/Hr (1.57 mL/Hr) IV Continuous <Continuous>  norepinephrine Infusion 0.05 MICROgram(s)/kG/Min (7.359 mL/Hr) IV Continuous <Continuous>  pantoprazole  Injectable 40 milliGRAM(s) IV Push daily  senna 1 Tablet(s) Oral at bedtime  simethicone 80 milliGRAM(s) Chew three times a day  sodium chloride 3%. 500 milliLiter(s) (50 mL/Hr) IV Continuous <Continuous>  spironolactone 25 milliGRAM(s) Oral daily  timolol 0.25% Solution 1 Drop(s) Both EYES two times a day      REVIEW OF SYSTEMS:  Negative except above    PHYSICAL EXAM:  T(C): 36.2 (06-25-18 @ 04:00), Max: 37.2 (06-24-18 @ 19:11)  HR: 88 (06-25-18 @ 06:30) (78 - 96)  BP: 112/39 (06-25-18 @ 06:30) (80/36 - 124/53)  RR: 20 (06-25-18 @ 06:30) (9 - 24)  SpO2: 100% (06-25-18 @ 07:53) (77% - 100%)    I&O's Summary    24 Jun 2018 07:01  -  25 Jun 2018 07:00  --------------------------------------------------------  IN: 707 mL / OUT: 2460 mL / NET: -1753 mL      General: Intubated  Cardiovascular: Irregularly irregular, 3/6 systolic and diastolic  Respiratory: coarse rhonchi anteriorly	  Gastrointestinal: soft, distended  Skin: No rashes, No ecchymoses, No cyanosis	  Extremities: 3-4+ edema to abdomen      LABS:	 	                        9.0    12.67 )-----------( 123      ( 25 Jun 2018 04:17 )             29.0     06-25    140  |  89<L>  |  115<HH>  ----------------------------<  121<H>  3.6   |  36<H>  |  1.8<H>    Ca    8.9      25 Jun 2018 04:17  Phos  3.6     06-24  Mg     2.7     06-25    TPro  6.7  /  Alb  3.1<L>  /  TBili  0.7  /  DBili  x   /  AST  17  /  ALT  7   /  AlkPhos  70  06-24    CARDIAC MARKERS ( 25 Jun 2018 04:17 )  x     / 0.05 ng/mL / 18 U/L / x     / <1.0 ng/mL  CARDIAC MARKERS ( 24 Jun 2018 07:59 )  x     / 0.05 ng/mL / x     / x     / x      CARDIAC MARKERS ( 23 Jun 2018 19:33 )  x     / 0.07 ng/mL / 36 U/L / x     / 1.6 ng/mL      PT/INR - ( 25 Jun 2018 04:17 )   PT: 15.70 sec;   INR: 1.44 ratio       PTT - ( 25 Jun 2018 04:17 )  PTT:30.1 sec    proBNP:          46940 pg/mL   06-23-18 @ 19:33           9445 pg/mL   06-20-18 @ 18:10        < from: 12 Lead ECG (06.24.18 @ 11:04) >  Diagnosis Line Atrial fibrillation with premature ventricular or aberrantly conducted  complexes  Left anterior fascicular block  Left ventricular hypertrophy with QRS widening  Nonspecific ST and T wave abnormality  Prolonged QT  Abnormal ECG    < end of copied text >      < from: CT Head No Cont (06.24.18 @ 20:44) >  IMPRESSION:     1.  Acute to early subacute right-sided subdural hematoma along the right   convexity extending along anterior and posterior falx and right tentorium    measuring up to 1.6 cm.    2.  Associated mass effect with right to left midline shift of 1.0 cm and   effacement of sulci of the right cerebral hemisphere.    < end of copied text >

## 2018-06-25 NOTE — CONSULT NOTE ADULT - SUBJECTIVE AND OBJECTIVE BOX
Date of Admission:    CHIEF COMPLAINT:    HISTORY OF PRESENT ILLNESS:  HPI:  The patient is a 77-year-old female with a PMH of HTN, type II DM, CAD, A-Fib (on Eliquis), HFpEF, endocarditis, COPD (on 2 L home O2), DVT, CKD, GERD, and depression who presented from SNF with progressively increasing SOB and bilateral lower extremity edema. History was obtained from the patient's son who stated that over the past 3 weeks he noted his mother to have increased swelling of her legs. The patient was brought to the ED on 6/20 where she received diuretic therapy, Frazier catheter placement, and was discharged back to SNF. However, the patient continued to experience SOB and was found to be lethargic. Upon current presentation, the patient was noted to be hypotensive, tachycardic, tachypneic, and hypoxic. She was placed on a NRB mask and subsequently switched to a venturi mask where her SpO2 was ~93%. She received nebulizer treatment, Lasix 40 mg IV, Zosyn, Vancomycin, and Azithromycin in the ED.    At baseline, the patient is alert and able to ambulate at her SNF.  Her son states that she has been evaluated in the hospital three times this year for similar complaints and has received increased amounts of Lasix. (23 Jun 2018 22:16)    Today, patient lays comfortable and intubated in bed.     PAST MEDICAL & SURGICAL HISTORY:  MI, old  Endocarditis  Glaucoma  Diabetes mellitus, type 2  Anemia  Depression  Chronic obstructive pulmonary disease  Sepsis  Gastroesophageal reflux disease  Atrial fibrillation  Hypertension  Kidney failure  No significant past surgical history    HEALTH ISSUES - PROBLEM Dx:        FAMILY HISTORY:    Allergies    chocolate (Unknown)  No Known Drug Allergies    Intolerances    	  Home Medications:  allopurinol 300 mg oral tablet: 1 tab(s) orally once a day (20 Jun 2018 15:31)  atorvastatin 10 mg oral tablet: 1 tab(s) orally once a day (20 Jun 2018 15:31)  Azopt 1% ophthalmic suspension: 1 drop(s) to each affected eye 3 times a day (20 Jun 2018 15:31)  bumetanide 1 mg oral tablet: 1 tab(s) orally once a day (20 Jun 2018 15:31)  Colace 100 mg oral capsule: 1 cap(s) orally 2 times a day (20 Jun 2018 15:31)  Combivent 18 mcg-103 mcg-/inh inhalation aerosol:  (20 Jun 2018 15:31)  digoxin 125 mcg (0.125 mg) oral tablet: 1 tab(s) orally once a day (20 Jun 2018 15:31)  Eliquis 2.5 mg oral tablet: 1 tab(s) orally 2 times a day (20 Jun 2018 15:31)  ferrous sulfate 325 mg (65 mg elemental iron) oral tablet: 1 tab(s) orally 3 times a day (20 Jun 2018 15:31)  furosemide 40 mg oral tablet: 1 tab(s) orally once a day (20 Jun 2018 15:31)  latanoprost 0.005% ophthalmic solution: 1 drop(s) to each affected eye once a day (in the evening) (20 Jun 2018 15:31)  magnesium oxide 500 mg oral tablet: 1 tab(s) orally once a day (20 Jun 2018 15:31)  metOLazone 5 mg oral tablet: 1 tab(s) orally once a day (20 Jun 2018 15:31)  Metoprolol Succinate ER 25 mg oral tablet, extended release: 1 tab(s) orally once a day (20 Jun 2018 15:31)  oxycodone-acetaminophen 5 mg-300 mg oral tablet: 1 tab(s) orally every 6 hours (20 Jun 2018 15:31)  Procrit 2000 units/mL injectable solution:  (20 Jun 2018 15:31)  Senna 8.6 mg oral tablet: 1 tab(s) orally once a day (at bedtime) (20 Jun 2018 15:31)  sildenafil 20 mg oral tablet: 1 tab(s) orally 3 times a day (20 Jun 2018 15:31)  simethicone 80 mg oral tablet: 1 tab(s) orally 3 times a day (after meals) (20 Jun 2018 15:31)  timolol hemihydrate 0.5% ophthalmic solution: 1 drop(s) to each affected eye 2 times a day (20 Jun 2018 15:31)  Vitamin C 500 mg oral tablet: 1 tab(s) orally once a day (20 Jun 2018 15:31)  zinc sulfate 220 mg oral capsule: 1 cap(s) orally 3 times a day (20 Jun 2018 15:31)    MEDICATIONS  (STANDING):  acetazolamide    Tablet 250 milliGRAM(s) Oral every 12 hours  ALBUTerol    90 MICROgram(s) HFA Inhaler 2 Puff(s) Inhalation every 6 hours  allopurinol 300 milliGRAM(s) Oral daily  atorvastatin 10 milliGRAM(s) Oral at bedtime  chlorhexidine 0.12% Liquid 15 milliLiter(s) Swish and Spit two times a day  digoxin     Tablet 0.125 milliGRAM(s) Oral daily  docusate sodium 100 milliGRAM(s) Oral two times a day  dorzolamide 2% Ophthalmic Solution 1 Drop(s) Both EYES three times a day  furosemide   Injectable 40 milliGRAM(s) IV Push two times a day  ipratropium 17 MICROgram(s) HFA Inhaler 1 Puff(s) Inhalation every 6 hours  latanoprost 0.005% Ophthalmic Solution 1 Drop(s) Both EYES at bedtime  levETIRAcetam  IVPB 500 milliGRAM(s) IV Intermittent every 12 hours  meropenem  IVPB      meropenem  IVPB 1000 milliGRAM(s) IV Intermittent every 12 hours  metoprolol succinate ER 25 milliGRAM(s) Oral daily  midazolam Infusion 0.02 mG/kG/Hr (1.57 mL/Hr) IV Continuous <Continuous>  norepinephrine Infusion 0.05 MICROgram(s)/kG/Min (7.359 mL/Hr) IV Continuous <Continuous>  pantoprazole  Injectable 40 milliGRAM(s) IV Push daily  senna 1 Tablet(s) Oral at bedtime  simethicone 80 milliGRAM(s) Chew three times a day  sodium chloride 3%. 500 milliLiter(s) (50 mL/Hr) IV Continuous <Continuous>  spironolactone 25 milliGRAM(s) Oral daily  timolol 0.25% Solution 1 Drop(s) Both EYES two times a day    MEDICATIONS  (PRN):              SOCIAL HISTORY:    [ x] Non-smoker  [ ] Smoker  [ ] Alcohol      REVIEW OF SYSTEMS:  As mentioned in the HPI    PHYSICAL EXAM:  T(C): 36.2 (06-25-18 @ 04:00), Max: 37.2 (06-24-18 @ 19:11)  HR: 88 (06-25-18 @ 06:30) (78 - 96)  BP: 112/39 (06-25-18 @ 06:30) (80/36 - 124/53)  RR: 20 (06-25-18 @ 06:30) (9 - 24)  SpO2: 100% (06-25-18 @ 07:53) (77% - 100%)  Wt(kg): --  I&O's Summary    24 Jun 2018 07:01  -  25 Jun 2018 07:00  --------------------------------------------------------  IN: 707 mL / OUT: 2460 mL / NET: -1753 mL      Daily     Daily     General Appearance: Normal	  Cardiovascular: Normal S1 S2, No JVD, No murmurs, No edema  Respiratory: Lungs clear to auscultation	  Gastrointestinal:  Soft, Non-tender  Skin: No rashes, No ecchymoses, No cyanosis	  Extremities: 3+ B/L pitting edema  Vascular: Peripheral pulses palpable 2+ bilaterally        LABS:	 	                          9.0    12.67 )-----------( 123      ( 25 Jun 2018 04:17 )             29.0     06-25    140  |  89<L>  |  115<HH>  ----------------------------<  121<H>  3.6   |  36<H>  |  1.8<H>    Ca    8.9      25 Jun 2018 04:17  Phos  3.6     06-24  Mg     2.7     06-25    TPro  6.7  /  Alb  3.1<L>  /  TBili  0.7  /  DBili  x   /  AST  17  /  ALT  7   /  AlkPhos  70  06-24    CARDIAC MARKERS ( 25 Jun 2018 04:17 )  x     / 0.05 ng/mL / 18 U/L / x     / <1.0 ng/mL  CARDIAC MARKERS ( 24 Jun 2018 07:59 )  x     / 0.05 ng/mL / x     / x     / x      CARDIAC MARKERS ( 23 Jun 2018 19:33 )  x     / 0.07 ng/mL / 36 U/L / x     / 1.6 ng/mL      PT/INR - ( 25 Jun 2018 04:17 )   PT: 15.70 sec;   INR: 1.44 ratio         PTT - ( 25 Jun 2018 04:17 )  PTT:30.1 sec    proBNP:          06515 pg/mL   06-23-18 @ 19:33           9445 pg/mL   06-20-18 @ 18:10    Lipid Profile:   HgA1c:   TSH:       CARDIAC MARKERS:            TELEMETRY EVENTS: 	    ECG:    < from: 12 Lead ECG (06.24.18 @ 11:04) >  Ventricular Rate 97 BPM    Atrial Rate 96 BPM    QRS Duration 116 ms    Q-T Interval 412 ms    QTC Calculation(Bezet) 523 ms    R Axis -54 degrees    T Axis 101 degrees    Diagnosis Line Atrial fibrillation with premature ventricular or aberrantly conducted  complexes  Left anterior fascicular block  Left ventricular hypertrophy with QRS widening  Nonspecific ST and T wave abnormality  Prolonged QT  Abnormal ECG    < end of copied text >  	  RADIOLOGY:  OTHER: 	    PREVIOUS DIAGNOSTIC TESTING:    [x ] Echocardiogram:  [ ]  Catheterization:  [ ] Stress Test:  	  	  ASSESSMENT/PLAN: Date of Admission:    CHIEF COMPLAINT:    HISTORY OF PRESENT ILLNESS:  HPI:  The patient is a 77-year-old female with a PMH of HTN, type II DM, CAD, A-Fib (on Eliquis), HFpEF, endocarditis, COPD (on 2 L home O2), DVT, CKD, GERD, and depression who presented from SNF with progressively increasing SOB and bilateral lower extremity edema. History was obtained from the patient's son who stated that over the past 3 weeks he noted his mother to have increased swelling of her legs. The patient was brought to the ED on 6/20 where she received diuretic therapy, Frazier catheter placement, and was discharged back to SNF. However, the patient continued to experience SOB and was found to be lethargic. Upon current presentation, the patient was noted to be hypotensive, tachycardic, tachypneic, and hypoxic. She was placed on a NRB mask and subsequently switched to a venturi mask where her SpO2 was ~93%. She received nebulizer treatment, Lasix 40 mg IV, Zosyn, Vancomycin, and Azithromycin in the ED.    At baseline, the patient is alert and able to ambulate at her SNF.  Her son states that she has been evaluated in the hospital three times this year for similar complaints and has received increased amounts of Lasix. (23 Jun 2018 22:16)    Today, patient lays comfortable and intubated in bed.     PAST MEDICAL & SURGICAL HISTORY:  MI, old  Endocarditis  Glaucoma  Diabetes mellitus, type 2  Anemia  Depression  Chronic obstructive pulmonary disease  Sepsis  Gastroesophageal reflux disease  Atrial fibrillation  Hypertension  Kidney failure  No significant past surgical history    HEALTH ISSUES - PROBLEM Dx:        FAMILY HISTORY:    Allergies    chocolate (Unknown)  No Known Drug Allergies    Intolerances    	  Home Medications:  allopurinol 300 mg oral tablet: 1 tab(s) orally once a day (20 Jun 2018 15:31)  atorvastatin 10 mg oral tablet: 1 tab(s) orally once a day (20 Jun 2018 15:31)  Azopt 1% ophthalmic suspension: 1 drop(s) to each affected eye 3 times a day (20 Jun 2018 15:31)  bumetanide 1 mg oral tablet: 1 tab(s) orally once a day (20 Jun 2018 15:31)  Colace 100 mg oral capsule: 1 cap(s) orally 2 times a day (20 Jun 2018 15:31)  Combivent 18 mcg-103 mcg-/inh inhalation aerosol:  (20 Jun 2018 15:31)  digoxin 125 mcg (0.125 mg) oral tablet: 1 tab(s) orally once a day (20 Jun 2018 15:31)  Eliquis 2.5 mg oral tablet: 1 tab(s) orally 2 times a day (20 Jun 2018 15:31)  ferrous sulfate 325 mg (65 mg elemental iron) oral tablet: 1 tab(s) orally 3 times a day (20 Jun 2018 15:31)  furosemide 40 mg oral tablet: 1 tab(s) orally once a day (20 Jun 2018 15:31)  latanoprost 0.005% ophthalmic solution: 1 drop(s) to each affected eye once a day (in the evening) (20 Jun 2018 15:31)  magnesium oxide 500 mg oral tablet: 1 tab(s) orally once a day (20 Jun 2018 15:31)  metOLazone 5 mg oral tablet: 1 tab(s) orally once a day (20 Jun 2018 15:31)  Metoprolol Succinate ER 25 mg oral tablet, extended release: 1 tab(s) orally once a day (20 Jun 2018 15:31)  oxycodone-acetaminophen 5 mg-300 mg oral tablet: 1 tab(s) orally every 6 hours (20 Jun 2018 15:31)  Procrit 2000 units/mL injectable solution:  (20 Jun 2018 15:31)  Senna 8.6 mg oral tablet: 1 tab(s) orally once a day (at bedtime) (20 Jun 2018 15:31)  sildenafil 20 mg oral tablet: 1 tab(s) orally 3 times a day (20 Jun 2018 15:31)  simethicone 80 mg oral tablet: 1 tab(s) orally 3 times a day (after meals) (20 Jun 2018 15:31)  timolol hemihydrate 0.5% ophthalmic solution: 1 drop(s) to each affected eye 2 times a day (20 Jun 2018 15:31)  Vitamin C 500 mg oral tablet: 1 tab(s) orally once a day (20 Jun 2018 15:31)  zinc sulfate 220 mg oral capsule: 1 cap(s) orally 3 times a day (20 Jun 2018 15:31)    MEDICATIONS  (STANDING):  acetazolamide    Tablet 250 milliGRAM(s) Oral every 12 hours  ALBUTerol    90 MICROgram(s) HFA Inhaler 2 Puff(s) Inhalation every 6 hours  allopurinol 300 milliGRAM(s) Oral daily  atorvastatin 10 milliGRAM(s) Oral at bedtime  chlorhexidine 0.12% Liquid 15 milliLiter(s) Swish and Spit two times a day  digoxin     Tablet 0.125 milliGRAM(s) Oral daily  docusate sodium 100 milliGRAM(s) Oral two times a day  dorzolamide 2% Ophthalmic Solution 1 Drop(s) Both EYES three times a day  furosemide   Injectable 40 milliGRAM(s) IV Push two times a day  ipratropium 17 MICROgram(s) HFA Inhaler 1 Puff(s) Inhalation every 6 hours  latanoprost 0.005% Ophthalmic Solution 1 Drop(s) Both EYES at bedtime  levETIRAcetam  IVPB 500 milliGRAM(s) IV Intermittent every 12 hours  meropenem  IVPB      meropenem  IVPB 1000 milliGRAM(s) IV Intermittent every 12 hours  metoprolol succinate ER 25 milliGRAM(s) Oral daily  midazolam Infusion 0.02 mG/kG/Hr (1.57 mL/Hr) IV Continuous <Continuous>  norepinephrine Infusion 0.05 MICROgram(s)/kG/Min (7.359 mL/Hr) IV Continuous <Continuous>  pantoprazole  Injectable 40 milliGRAM(s) IV Push daily  senna 1 Tablet(s) Oral at bedtime  simethicone 80 milliGRAM(s) Chew three times a day  sodium chloride 3%. 500 milliLiter(s) (50 mL/Hr) IV Continuous <Continuous>  spironolactone 25 milliGRAM(s) Oral daily  timolol 0.25% Solution 1 Drop(s) Both EYES two times a day    MEDICATIONS  (PRN):              SOCIAL HISTORY:    [ x] Non-smoker  [ ] Smoker  [ ] Alcohol      REVIEW OF SYSTEMS:  As mentioned in the HPI    PHYSICAL EXAM:  T(C): 36.2 (06-25-18 @ 04:00), Max: 37.2 (06-24-18 @ 19:11)  HR: 88 (06-25-18 @ 06:30) (78 - 96)  BP: 112/39 (06-25-18 @ 06:30) (80/36 - 124/53)  RR: 20 (06-25-18 @ 06:30) (9 - 24)  SpO2: 100% (06-25-18 @ 07:53) (77% - 100%)  Wt(kg): --  I&O's Summary    24 Jun 2018 07:01  -  25 Jun 2018 07:00  --------------------------------------------------------  IN: 707 mL / OUT: 2460 mL / NET: -1753 mL      Daily     Daily     General Appearance: Normal	  Cardiovascular: Irregularly irregular. 3/6 systolic ejection murmur radiation to carotids Normal S1 S2. Elevated JVD  Respiratory: Could not assess	  Gastrointestinal:  Soft, Non-tender  Skin: No rashes, No ecchymoses, No cyanosis	  Extremities: 3+ B/L pitting edema  Vascular: Peripheral pulses palpable 2+ bilaterally        LABS:	 	                          9.0    12.67 )-----------( 123      ( 25 Jun 2018 04:17 )             29.0     06-25    140  |  89<L>  |  115<HH>  ----------------------------<  121<H>  3.6   |  36<H>  |  1.8<H>    Ca    8.9      25 Jun 2018 04:17  Phos  3.6     06-24  Mg     2.7     06-25    TPro  6.7  /  Alb  3.1<L>  /  TBili  0.7  /  DBili  x   /  AST  17  /  ALT  7   /  AlkPhos  70  06-24    CARDIAC MARKERS ( 25 Jun 2018 04:17 )  x     / 0.05 ng/mL / 18 U/L / x     / <1.0 ng/mL  CARDIAC MARKERS ( 24 Jun 2018 07:59 )  x     / 0.05 ng/mL / x     / x     / x      CARDIAC MARKERS ( 23 Jun 2018 19:33 )  x     / 0.07 ng/mL / 36 U/L / x     / 1.6 ng/mL      PT/INR - ( 25 Jun 2018 04:17 )   PT: 15.70 sec;   INR: 1.44 ratio         PTT - ( 25 Jun 2018 04:17 )  PTT:30.1 sec    proBNP:          84875 pg/mL   06-23-18 @ 19:33           9445 pg/mL   06-20-18 @ 18:10    Lipid Profile:   HgA1c:   TSH:       CARDIAC MARKERS:            TELEMETRY EVENTS: 	    ECG:    < from: 12 Lead ECG (06.24.18 @ 11:04) >  Ventricular Rate 97 BPM    Atrial Rate 96 BPM    QRS Duration 116 ms    Q-T Interval 412 ms    QTC Calculation(Bezet) 523 ms    R Axis -54 degrees    T Axis 101 degrees    Diagnosis Line Atrial fibrillation with premature ventricular or aberrantly conducted  complexes  Left anterior fascicular block  Left ventricular hypertrophy with QRS widening  Nonspecific ST and T wave abnormality  Prolonged QT  Abnormal ECG    < end of copied text >  	  RADIOLOGY:  OTHER: 	    PREVIOUS DIAGNOSTIC TESTING:    [x ] Echocardiogram:  [ ]  Catheterization:  [ ] Stress Test:  	  	  ASSESSMENT/PLAN:

## 2018-06-25 NOTE — CONSULT NOTE ADULT - ATTENDING COMMENTS
Acute right-sided SDH with mass effect Acute right-sided SDH with mass effect  Acute on chronic HFpEF secondary to VHD/Diastolic dysfunction  Severe AI/AS  AF - rate controlled      - consider more aggressive IV diuresis (restarting zaroxolyn and/or bumex)  - management as per ICU team  - recall prn

## 2018-06-25 NOTE — CONSULT NOTE ADULT - SUBJECTIVE AND OBJECTIVE BOX
Patient is a 77y old  Female who presents with a chief complaint of Progressive SOB and lower extremity edema (2018 22:16)      HPI:  The patient is a 77-year-old female with a PMH of HTN, type II DM, CAD, A-Fib (on Eliquis), HFpEF, endocarditis, COPD (on 2 L home O2), DVT, CKD, GERD, and depression who presented from SNF with progressively increasing SOB and bilateral lower extremity edema. History was obtained from the patient's son who stated that over the past 3 weeks he noted his mother to have increased swelling of her legs. The patient was brought to the ED on  where she received diuretic therapy, Frazier catheter placement, and was discharged back to SNF. However, the patient continued to experience SOB and was found to be lethargic. Upon current presentation, the patient was noted to be hypotensive, tachycardic, tachypneic, and hypoxic. She was placed on a NRB mask and subsequently switched to a venturi mask where her SpO2 was ~93%. She received nebulizer treatment, Lasix 40 mg IV, Zosyn, Vancomycin, and Azithromycin in the ED.    At baseline, the patient is alert and able to ambulate at her SNF.  Her son states that she has been evaluated in the hospital three times this year for similar complaints and has received increased amounts of Lasix. (2018 22:16), ADMITTED TO FLOOR, CT HEAD, SBH MIDLINE SHIFT, S/P K CENTRA, S/P NEUR SX EVAL, NO INTERVENTIONS, ON VERSED/ VERSED      PAST MEDICAL & SURGICAL HISTORY:  MI, old  Endocarditis  Glaucoma  Diabetes mellitus, type 2  Anemia  Depression  Chronic obstructive pulmonary disease  Sepsis  Gastroesophageal reflux disease  Atrial fibrillation  Hypertension  Kidney failure  No significant past surgical histor        Allergies    chocolate (Unknown)  No Known Drug Allergies    Intolerances        acetazolamide    Tablet 250 milliGRAM(s) Oral every 12 hours  ALBUTerol    90 MICROgram(s) HFA Inhaler 2 Puff(s) Inhalation every 6 hours  allopurinol 300 milliGRAM(s) Oral daily  atorvastatin 10 milliGRAM(s) Oral at bedtime  chlorhexidine 0.12% Liquid 15 milliLiter(s) Swish and Spit two times a day  digoxin     Tablet 0.125 milliGRAM(s) Oral daily  docusate sodium 100 milliGRAM(s) Oral two times a day  dorzolamide 2% Ophthalmic Solution 1 Drop(s) Both EYES three times a day  furosemide   Injectable 40 milliGRAM(s) IV Push two times a day  ipratropium 17 MICROgram(s) HFA Inhaler 1 Puff(s) Inhalation every 6 hours  latanoprost 0.005% Ophthalmic Solution 1 Drop(s) Both EYES at bedtime  levETIRAcetam  IVPB 500 milliGRAM(s) IV Intermittent every 12 hours  meropenem  IVPB      meropenem  IVPB 1000 milliGRAM(s) IV Intermittent every 12 hours  metoprolol succinate ER 25 milliGRAM(s) Oral daily  midazolam Infusion 0.02 mG/kG/Hr IV Continuous <Continuous>  norepinephrine Infusion 0.05 MICROgram(s)/kG/Min IV Continuous <Continuous>  pantoprazole  Injectable 40 milliGRAM(s) IV Push daily  senna 1 Tablet(s) Oral at bedtime  simethicone 80 milliGRAM(s) Chew three times a day  sodium chloride 3%. 500 milliLiter(s) IV Continuous <Continuous>  spironolactone 25 milliGRAM(s) Oral daily  timolol 0.25% Solution 1 Drop(s) Both EYES two times a day  vancomycin  IVPB 1000 milliGRAM(s) IV Intermittent every 24 hours  : Home Meds:      PHYSICAL EXAM    ICU Vital Signs Last 24 Hrs  T(C): 36.2 (2018 04:00), Max: 37.2 (2018 19:11)  T(F): 97.1 (2018 04:00), Max: 98.9 (2018 19:11)  HR: 88 (2018 06:30) (78 - 96)  BP: 112/39 (2018 06:30) (80/36 - 124/53)  BP(mean): 56 (2018 06:30) (49 - 122)  ABP: --  ABP(mean): --  RR: 20 (2018 06:30) (9 - 24)  SpO2: 99% (2018 06:30) (77% - 100%)      General:  HEENT:  VERSED/ OFF LEVOPHED, HYPERTONIC 50 CC/H          Lymph Nodes: ANISOCORIA, BREATHING OVER VENT, WITHDRAW TO PAINFUL STIMULI  Lungs: DEC BS RI SIDE  Cardiovascular: Regular  Abdomen: Soft, Positive BS  Extremities: No clubbing  Skin: Warm  Neurological: AS ABOVE      18 @ 07:01  -  18 @ 07:00  --------------------------------------------------------  IN:    midazolam Infusion: 2 mL    norepinephrine Infusion: 5 mL    sodium chloride 3%.: 350 mL    Solution: 250 mL    Solution: 100 mL  Total IN: 707 mL    OUT:    Indwelling Catheter - Urethral: 810 mL    Voided: 1650 mL  Total OUT: 2460 mL    Total NET: -1753 mL          LABS:                          9.0    12.67 )-----------( 123      ( 2018 04:17 )             29.0                                                   140  |  89<L>  |  115<HH>  ----------------------------<  121<H>  3.6   |  36<H>  |  1.8<H>    Ca    8.9      2018 04:17  Phos  3.6       Mg     2.7         TPro  6.7  /  Alb  3.1<L>  /  TBili  0.7  /  DBili  x   /  AST  17  /  ALT  7   /  AlkPhos  70        PT/INR - ( 2018 04:17 )   PT: 15.70 sec;   INR: 1.44 ratio         PTT - ( 2018 04:17 )  PTT:30.1 sec                                       Urinalysis Basic - ( 2018 20:05 )    Color: Yellow / Appearance: Cloudy / S.015 / pH: x  Gluc: x / Ketone: Negative  / Bili: Negative / Urobili: 0.2 mg/dL   Blood: x / Protein: 100 mg/dL / Nitrite: Negative   Leuk Esterase: Large / RBC: 25-50 /HPF / WBC 26-50 /HPF   Sq Epi: x / Non Sq Epi: Few /HPF / Bacteria: Moderate /HPF        CARDIAC MARKERS ( 2018 04:17 )  x     / 0.05 ng/mL / 18 U/L / x     / <1.0 ng/mL  CARDIAC MARKERS ( 2018 07:59 )  x     / 0.05 ng/mL / x     / x     / x      CARDIAC MARKERS ( 2018 19:33 )  x     / 0.07 ng/mL / 36 U/L / x     / 1.6 ng/mL                                            LIVER FUNCTIONS - ( 2018 21:09 )  Alb: 3.1 g/dL / Pro: 6.7 g/dL / ALK PHOS: 70 U/L / ALT: 7 U/L / AST: 17 U/L / GGT: x                                                  Culture - Urine (collected 2018 20:05)  Source: .Urine Catheterized  Preliminary Report (2018 20:46):    >100,000 CFU/ml Gram Negative Rods    Culture - Blood (collected 2018 19:28)  Source: .Blood Blood  Preliminary Report (2018 01:01):    No growth to date.    Culture - Blood (collected 2018 19:28)  Source: .Blood Blood  Preliminary Report (2018 01:01):    No growth to date.                                                   Mode: AC/ CMV (Assist Control/ Continuous Mandatory Ventilation)  RR (machine): 14  TV (machine): 450  FiO2: 40  PEEP: 5  ITime: 1  MAP: 11  PIP: 27                                      ABG - ( 2018 04:31 )  pH, Arterial: 7.52  pH, Blood: x     /  pCO2: 48    /  pO2: 253   / HCO3: 39    / Base Excess: 15.3  /  SaO2: 101                 X-Rays                                                                                     ECHO    MEDICATIONS  (STANDING):  acetazolamide    Tablet 250 milliGRAM(s) Oral every 12 hours  ALBUTerol    90 MICROgram(s) HFA Inhaler 2 Puff(s) Inhalation every 6 hours  allopurinol 300 milliGRAM(s) Oral daily  atorvastatin 10 milliGRAM(s) Oral at bedtime  chlorhexidine 0.12% Liquid 15 milliLiter(s) Swish and Spit two times a day  digoxin     Tablet 0.125 milliGRAM(s) Oral daily  docusate sodium 100 milliGRAM(s) Oral two times a day  dorzolamide 2% Ophthalmic Solution 1 Drop(s) Both EYES three times a day  furosemide   Injectable 40 milliGRAM(s) IV Push two times a day  ipratropium 17 MICROgram(s) HFA Inhaler 1 Puff(s) Inhalation every 6 hours  latanoprost 0.005% Ophthalmic Solution 1 Drop(s) Both EYES at bedtime  levETIRAcetam  IVPB 500 milliGRAM(s) IV Intermittent every 12 hours  meropenem  IVPB      meropenem  IVPB 1000 milliGRAM(s) IV Intermittent every 12 hours  metoprolol succinate ER 25 milliGRAM(s) Oral daily  midazolam Infusion 0.02 mG/kG/Hr (1.57 mL/Hr) IV Continuous <Continuous>  norepinephrine Infusion 0.05 MICROgram(s)/kG/Min (7.359 mL/Hr) IV Continuous <Continuous>  pantoprazole  Injectable 40 milliGRAM(s) IV Push daily  senna 1 Tablet(s) Oral at bedtime  simethicone 80 milliGRAM(s) Chew three times a day  sodium chloride 3%. 500 milliLiter(s) (50 mL/Hr) IV Continuous <Continuous>  spironolactone 25 milliGRAM(s) Oral daily  timolol 0.25% Solution 1 Drop(s) Both EYES two times a day  vancomycin  IVPB 1000 milliGRAM(s) IV Intermittent every 24 hours    MEDICATIONS  (PRN):

## 2018-06-25 NOTE — AIRWAY PLACEMENT NOTE ADULT - POST AIRWAY PLACEMENT ASSESSMENT:
chest excursion noted/breath sounds bilateral/CXR pending/breath sounds equal/positive end tidal CO2 noted

## 2018-06-25 NOTE — PROGRESS NOTE ADULT - ASSESSMENT
77 y.o F with acute to earlier subacute right-sided SDH extending into anterior and posterior falx and right cerebellar region 1.6 cm in size, with right to left midline shift of 6 mm.  On Eliquis.     Plan:  Pt. son Tay Stephens called again [phone], spoke with Dr. Sullivan. As per Dr. Sullivan risk, benifets and prognosis w/wo craniotomy for evacuation of SDH were explained and discussed. Mr. Stephens decide not to proceed with surgery.   Cont. current medical management as per ICU team  Cont NS 3 %   HOB at 30 degrees  Keppra  Case d/w Dr. Sullivan , Dr. Thurman, ICU resident.   Dr. Sullivan will F/U.

## 2018-06-25 NOTE — CONSULT NOTE ADULT - ASSESSMENT
77yFemale being evaluated for goals of care, pt (+) large SDH with poor prognosis. Son is decision maker. Pt (+) DNR currently vented. Neurology following. Mental status is unresponsive. Palliative care will reach out to son.   Pt not uncomfortable   Previously resided in SNF was ambulatory.       Recommendations: 77yFemale being evaluated for goals of care, pt (+) large SDH with poor prognosis. Son is decision maker. Pt (+) DNR currently vented. Neurology following. Mental status is unresponsive. Palliative care will reach out to son.   Pt not uncomfortable   Previously resided in SNF has had multiple long term co-morbidities. Son aware of poor prognosis made pt DNR      Recommendations:  Spoke to son Tay   DNR  Liberation possibly 6/26/18  Palliative will follow up at 11am 6/26/18 he is getting a  and other family members together

## 2018-06-25 NOTE — CHART NOTE - NSCHARTNOTEFT_GEN_A_CORE
Called by nurse to assess pt for change in mental status. Pt initially was lethargic but arousable. Pt is now only response to painful stimuli. Eyes fixed b/l. Neurosurgery called to reeval pt. Neurosurgery attending and PA spoke to son and he refused to give consent for surgery. Pt then became hypoxic and desaturated to 80's on BiPAP. Decision was made to intubate pt for airway protection and hypoxic resp failure. Spoke with daya Cross and he agreed to intubation.

## 2018-06-25 NOTE — CONSULT NOTE ADULT - SUBJECTIVE AND OBJECTIVE BOX
***STROKE HEMORRHAGE CONSULT NOTE    Chief Complaint: Intracranial Hemorrhage (SDH)        HPI:  The patient is a 77-year-old female with a PMH of HTN, type II DM, CAD, A-Fib (on Eliquis), HFpEF, endocarditis, COPD (on 2 L home O2), DVT, CKD, GERD, and depression who presented from SNF with progressively increasing SOB and bilateral lower extremity edema. History was obtained from the patient's son who stated that over the past 3 weeks he noted his mother to have increased swelling of her legs. The patient was brought to the ED on  where she received diuretic therapy, Frazier catheter placement, and was discharged back to SNF. However, the patient continued to experience SOB and was found to be lethargic. Upon current presentation, the patient was noted to be hypotensive, tachycardic, tachypneic, and hypoxic. She was placed on a NRB mask and subsequently switched to a venturi mask where her SpO2 was ~93%. She received nebulizer treatment, Lasix 40 mg IV, Zosyn, Vancomycin, and Azithromycin in the ED.    At baseline, the patient is alert and able to ambulate at her SNF.  Her son states that she has been evaluated in the hospital three times this year for similar complaints and has received increased amounts of Lasix. (2018 22:16)    Patient not responsive and intubated.  Not following commands    PAST MEDICAL & SURGICAL HISTORY:  MI, old  Endocarditis  Glaucoma  Diabetes mellitus, type 2  Anemia  Depression  Chronic obstructive pulmonary disease  Sepsis  Gastroesophageal reflux disease  Atrial fibrillation  Hypertension  Kidney failure  No significant past surgical history      FAMILY HISTORY:      Social History: (-) x 3    Allergies    chocolate (Unknown)  No Known Drug Allergies    Intolerances        MEDICATIONS  (STANDING):  acetazolamide    Tablet 250 milliGRAM(s) Oral every 12 hours  ALBUTerol    90 MICROgram(s) HFA Inhaler 2 Puff(s) Inhalation every 6 hours  allopurinol 300 milliGRAM(s) Oral daily  atorvastatin 10 milliGRAM(s) Oral at bedtime  chlorhexidine 0.12% Liquid 15 milliLiter(s) Swish and Spit two times a day  digoxin     Tablet 0.125 milliGRAM(s) Oral daily  docusate sodium 100 milliGRAM(s) Oral two times a day  dorzolamide 2% Ophthalmic Solution 1 Drop(s) Both EYES three times a day  furosemide   Injectable 40 milliGRAM(s) IV Push two times a day  ipratropium 17 MICROgram(s) HFA Inhaler 1 Puff(s) Inhalation every 6 hours  latanoprost 0.005% Ophthalmic Solution 1 Drop(s) Both EYES at bedtime  levETIRAcetam  IVPB 500 milliGRAM(s) IV Intermittent every 12 hours  meropenem  IVPB      meropenem  IVPB 1000 milliGRAM(s) IV Intermittent every 12 hours  metoprolol succinate ER 25 milliGRAM(s) Oral daily  midazolam Infusion 0.02 mG/kG/Hr (1.57 mL/Hr) IV Continuous <Continuous>  norepinephrine Infusion 0.05 MICROgram(s)/kG/Min (7.359 mL/Hr) IV Continuous <Continuous>  pantoprazole  Injectable 40 milliGRAM(s) IV Push daily  senna 1 Tablet(s) Oral at bedtime  simethicone 80 milliGRAM(s) Chew three times a day  sodium chloride 3%. 500 milliLiter(s) (50 mL/Hr) IV Continuous <Continuous>  spironolactone 25 milliGRAM(s) Oral daily  timolol 0.25% Solution 1 Drop(s) Both EYES two times a day    MEDICATIONS  (PRN):      Vital Signs Last 24 Hrs  T(C): 36.2 (2018 04:00), Max: 37.2 (2018 19:11)  T(F): 97.1 (2018 04:00), Max: 98.9 (2018 19:11)  HR: 88 (2018 06:30) (78 - 96)  BP: 112/39 (2018 06:30) (80/36 - 124/53)  BP(mean): 56 (2018 06:30) (49 - 122)  RR: 20 (2018 06:30) (9 - 24)  SpO2: 100% (2018 07:53) (77% - 100%)    Neurologic Examination:  Intubated, Not following commands, withdrawing to painful stimuli in all extremities, Right pupil 5mm and non reactive left pupil 2mm and non reactive, grimaces and resists eye opening.  Plantars up b/l        *NIH Stroke Scale (required): N/A  LOC (0-3); LOC Ques (0-2); LOC Comm (0-2); Gaze (0-2); Vision (0-3); Face (0-3); LUE (0-4); RUE (0-4); LLE (0-4); RLE (0-4); Ataxia (0-2); Sensory (0-2);  Lang (0-3); Dys (0-2); Neglect (0-2)    *Modified [place] Score (required): 5  0 - No symptoms.  1 - No significant disability. Able to carry out all usual activities, despite some symptoms.  2 - Slight disability. Able to look after own affairs without assistance, but unable to carry out all previous activities.  3 - Moderate disability. Requires some help, but able to walk unassisted.  4 - Moderately severe disability. Unable to attend to own bodily needs without assistance, and unable to walk unassisted.  5 - Severe disability. Requires constant nursing care and attention, bedridden, incontinent.  6 - Dead.    Intracerebral Hemorrhage:     No  ICH score (if yes then required):   GCS (3-4/+2; 5-12/+1; 13-15/0); Age =80 (+1); ICH volume =30mL (+1); IVH (+1); Infratentorial origin of hemorrhage (+1)    Subarachnoid Hemorrhage:     Yes       No (If yes Next 3 are required)    [place] Coma scale: E1_V1_M2 = 4_  Eyes: (1) Doesnt open; (2) Opens to pain; (3) Opens to Voice; (4) Opens spontaneously  Verbal: (1) Makes No Sound; (2) Incomprehensible sounds; (3) incoherent words; (4) Confused, disoriented; (5) Normal  Motor: (1) No Movement; (2) Extension to pain; (3) Abnormal Flexion to pain; (4) Flexion/Extension to pain; (5) Localizes to pain; (6) Obeys commands    Brody Navarrete Score: N/A  Mild Headache, Alert and Oriented, Minimal (if any) Nuchal Rigidity (+1)  Full Nuchal Rigidity, Moderate-Severe Headache, Alert and Oriented, No Neuro Deficit (Besides CN Palsy) (+2)  Lethargy or Confusion, Mild Focal Neurological Deficits (+3)  Stuporous, More Severe Focal Deficit (+4)  Comatose, showing signs of severe neurological impairment (ex: posturing) (+5)    Modified Martinez Scale: N/A  Focal of diffuse (thin <1mm) SAH with No IVH: 1  Focal of diffuse (thin <1mm) SAH with IVH:       2  Thick SAH present (>1mm) with No IVH:            3  Thick SAH present (>1mm) with IVH:                  4    Labs:   CBC Full  -  ( 2018 04:17 )  WBC Count : 12.67 K/uL  Hemoglobin : 9.0 g/dL  Hematocrit : 29.0 %  Platelet Count - Automated : 123 K/uL  Mean Cell Volume : 106.6 fL  Mean Cell Hemoglobin : 33.1 pg  Mean Cell Hemoglobin Concentration : 31.0 g/dL  Auto Neutrophil # : 11.27 K/uL  Auto Lymphocyte # : 0.75 K/uL  Auto Monocyte # : 0.56 K/uL  Auto Eosinophil # : 0.02 K/uL  Auto Basophil # : 0.02 K/uL  Auto Neutrophil % : 88.9 %  Auto Lymphocyte % : 5.9 %  Auto Monocyte % : 4.4 %  Auto Eosinophil % : 0.2 %  Auto Basophil % : 0.2 %        140  |  89<L>  |  115<HH>  ----------------------------<  121<H>  3.6   |  36<H>  |  1.8<H>    Ca    8.9      2018 04:17  Phos  3.6     24  Mg     2.7         TPro  6.7  /  Alb  3.1<L>  /  TBili  0.7  /  DBili  x   /  AST  17  /  ALT  7   /  AlkPhos  70  24    LIVER FUNCTIONS - ( 2018 21:09 )  Alb: 3.1 g/dL / Pro: 6.7 g/dL / ALK PHOS: 70 U/L / ALT: 7 U/L / AST: 17 U/L / GGT: x           PT/INR - ( 2018 04:17 )   PT: 15.70 sec;   INR: 1.44 ratio         PTT - ( 2018 04:17 )  PTT:30.1 sec  Urinalysis Basic - ( 2018 20:05 )    Color: Yellow / Appearance: Cloudy / S.015 / pH: x  Gluc: x / Ketone: Negative  / Bili: Negative / Urobili: 0.2 mg/dL   Blood: x / Protein: 100 mg/dL / Nitrite: Negative   Leuk Esterase: Large / RBC: 25-50 /HPF / WBC 26-50 /HPF   Sq Epi: x / Non Sq Epi: Few /HPF / Bacteria: Moderate /HPF        Neuroimaging:  NCHCT:  < from: CT Head No Cont (18 @ 20:44) >  INTERPRETATION:  Clinical History / Reason for exam: Lethargy.    Technique: Multiple contiguous axial CT images of the head were obtained    from the base of the skull to the vertexwithout administration of   intravenous contrast. Coronal and sagittal reformats were obtained.    Comparison: CT head performed 2018.    Findings:     There is an acute to early subacute right sided subdural hematoma along   the right convexitymeasuring up to 1.6 cm with associated mass effect   and right to left midline shift of 1.0 cm. The subdural hematoma extends   along the anterior and posterior falx and right cerebellar tentorium.    There is associated effacement of the sulcal pattern of the right   cerebral hemisphere. The left sulcal pattern is within normal limits for   the patient's stated age.      There are patchy areas of hypoattenuation in the periventricular and   subcortical white matter compatible with chronic microvascular ischemic   changes.    Grey-white differentiation is grossly preserved. Beam hardening artifact   is noted overlying the brain stem and posterior fossa which is inherent   to CT in this location.    The calvarium is intact.    The visualized paranasal sinuses and bilateral mastoid complexes are   unremarkable.       IMPRESSION:     1.  Acute to early subacute right-sided subdural hematoma along the right   convexity extending along anterior and posterior falx and right tentorium    measuring up to 1.6 cm.    2.  Associated mass effect with right to left midline shift of 1.0 cm and   effacement of sulci of the right cerebral hemisphere.      < end of copied text >        Assessment:  This is a 77y Female presenting with AMS, SOB found to have large SDH    Plan:   -Keppra 500mg BID  - REEG  - CTH N/C  - Prognosis poor     18 @ 09:12

## 2018-06-25 NOTE — CONSULT NOTE ADULT - ASSESSMENT
IMPRESSION: AHRF/ ACUTE SBH S/P FALL WAS ON ELIQUIS S/P NEURISX EVAL ON HYPERTONIC SALINE/ MULTIPLE CO MORBIDITIES      PLAN:    CNS: DAILY SEDATION VACATION, HYPERTONIC, CMP Q 4H, NEURO, KEPPRA    HEENT:  Oral care    PULMONARY:  HOB @ 45 degrees, DEC FIO2    CARDIOVASCULAR: IV LASIX/ ECHO    GI: GI prophylaxis                                          Feeding START FEEDING    RENAL:  F/u  lytes.  Correct as needed. accurate I/O     INFECTIOUS DISEASE: IV ABX TILL CX    HEMATOLOGICAL:  SCD    ENDOCRINE:  Follow up FS.  Insulin protocol if needed.    CODE STATUS: DNR    DISPOSITION: Pt requires continued monitoring in the MICU    PALLIATIVE CARE EVAL

## 2018-06-25 NOTE — CONSULT NOTE ADULT - SUBJECTIVE AND OBJECTIVE BOX
REQUESTED OF: DR Wisdom     CLINICAL ISSUE TO BE EVALUATED BY CONSULTANT: Goals of care and symptom management        GIOVANY PANIAGUA 77yFemale  HPI:  The patient is a 77-year-old female with a PMH of HTN, type II DM, CAD, A-Fib (on Eliquis), HFpEF, endocarditis, COPD (on 2 L home O2), DVT, CKD, GERD, and depression who presented from SNF with progressively increasing SOB and bilateral lower extremity edema. History was obtained from the patient's son who stated that over the past 3 weeks he noted his mother to have increased swelling of her legs. The patient was brought to the ED on 6/20 where she received diuretic therapy, Frazier catheter placement, and was discharged back to SNF. However, the patient continued to experience SOB and was found to be lethargic. Upon current presentation, the patient was noted to be hypotensive, tachycardic, tachypneic, and hypoxic. She was placed on a NRB mask and subsequently switched to a venturi mask where her SpO2 was ~93%. She received nebulizer treatment, Lasix 40 mg IV, Zosyn, Vancomycin, and Azithromycin in the ED.    At baseline, the patient is alert and able to ambulate at her SNF.  Her son states that she has been evaluated in the hospital three times this year for similar complaints and has received increased amounts of Lasix. (23 Jun 2018 22:16)        PAST MEDICAL & SURGICAL HISTORY:  MI, old  Endocarditis  Glaucoma  Diabetes mellitus, type 2  Anemia  Depression  Chronic obstructive pulmonary disease  Sepsis  Gastroesophageal reflux disease  Atrial fibrillation  Hypertension  Kidney failure  No significant past surgical history        PHYSICAL EXAM:      Constitutional:    Eyes:    ENMT:    Neck:    Breasts:    Back:    Respiratory:    Cardiovascular:    Gastrointestinal:    Genitourinary:    Rectal:    Extremities:    Vascular:    Neurological:    Skin:    Lymph Nodes:    Musculoskeletal:    Psychiatric:        T(C): 37.7, Max: 37.7 (08:00)  HR: 86 (78 - 96)  BP: 103/44 (80/36 - 124/53)  RR: 18 (9 - 24)  SpO2: 98% (77% - 100%)      LABS/STUDIES:  06-25    140  |  89<L>  |  115<HH>  ----------------------------<  121<H>  3.6   |  36<H>  |  1.8<H>    Ca    8.9      25 Jun 2018 04:17  Phos  3.6     06-24  Mg     2.7     06-25    TPro  6.7  /  Alb  3.1<L>  /  TBili  0.7  /  DBili  x   /  AST  17  /  ALT  7   /  AlkPhos  70  06-24                            9.0    12.67 )-----------( 123      ( 25 Jun 2018 04:17 )             29.0   HCT from 6/24/18    IMPRESSION:     1.  Acute to early subacute right-sided subdural hematoma along the right   convexity extending along anterior and posterior falx and right tentorium    measuring up to 1.6 cm.    2.  Associated mass effect with right to left midline shift of 1.0 cm and   effacement of sulci of the right cerebral hemisphere.      MEDICATIONS  (STANDING):  acetazolamide    Tablet 250 milliGRAM(s) Oral every 12 hours  ALBUTerol    90 MICROgram(s) HFA Inhaler 2 Puff(s) Inhalation every 6 hours  allopurinol 300 milliGRAM(s) Oral daily  atorvastatin 10 milliGRAM(s) Oral at bedtime  chlorhexidine 0.12% Liquid 15 milliLiter(s) Swish and Spit two times a day  digoxin     Tablet 0.125 milliGRAM(s) Oral daily  docusate sodium 100 milliGRAM(s) Oral two times a day  dorzolamide 2% Ophthalmic Solution 1 Drop(s) Both EYES three times a day  furosemide   Injectable 40 milliGRAM(s) IV Push two times a day  ipratropium 17 MICROgram(s) HFA Inhaler 1 Puff(s) Inhalation every 6 hours  latanoprost 0.005% Ophthalmic Solution 1 Drop(s) Both EYES at bedtime  levETIRAcetam  IVPB 500 milliGRAM(s) IV Intermittent every 12 hours  meropenem  IVPB      meropenem  IVPB 1000 milliGRAM(s) IV Intermittent every 12 hours  metoprolol succinate ER 25 milliGRAM(s) Oral daily  midazolam Infusion 0.02 mG/kG/Hr (1.57 mL/Hr) IV Continuous <Continuous>  norepinephrine Infusion 0.05 MICROgram(s)/kG/Min (7.359 mL/Hr) IV Continuous <Continuous>  pantoprazole  Injectable 40 milliGRAM(s) IV Push daily  senna 1 Tablet(s) Oral at bedtime  simethicone 80 milliGRAM(s) Chew three times a day  sodium chloride 3%. 500 milliLiter(s) (50 mL/Hr) IV Continuous <Continuous>  spironolactone 25 milliGRAM(s) Oral daily  timolol 0.25% Solution 1 Drop(s) Both EYES two times a day    MEDICATIONS  (PRN):              PPS (Palliative Performance Scale): 10 REQUESTED OF: DR Wisdom     CLINICAL ISSUE TO BE EVALUATED BY CONSULTANT: Goals of care and symptom management        GIOVANY PANIAGUA 77yFemale  HPI:  The patient is a 77-year-old female with a PMH of HTN, type II DM, CAD, A-Fib (on Eliquis), HFpEF, endocarditis, COPD (on 2 L home O2), DVT, CKD, GERD, and depression who presented from SNF with progressively increasing SOB and bilateral lower extremity edema. History was obtained from the patient's son who stated that over the past 3 weeks he noted his mother to have increased swelling of her legs. The patient was brought to the ED on 6/20 where she received diuretic therapy, Oro catheter placement, and was discharged back to SNF. However, the patient continued to experience SOB and was found to be lethargic. Upon current presentation, the patient was noted to be hypotensive, tachycardic, tachypneic, and hypoxic. She was placed on a NRB mask and subsequently switched to a venturi mask where her SpO2 was ~93%. She received nebulizer treatment, Lasix 40 mg IV, Zosyn, Vancomycin, and Azithromycin in the ED.    At baseline, the patient is alert and able to ambulate at her SNF.  Her son states that she has been evaluated in the hospital three times this year for similar complaints and has received increased amounts of Lasix. (23 Jun 2018 22:16)        PAST MEDICAL & SURGICAL HISTORY:  MI, old  Endocarditis  Glaucoma  Diabetes mellitus, type 2  Anemia  Depression  Chronic obstructive pulmonary disease  Sepsis  Gastroesophageal reflux disease  Atrial fibrillation  Hypertension  Kidney failure  No significant past surgical history        PHYSICAL EXAM:      Constitutional: pt appears obtunded    Respiratory: vented    Cardiovascular: (-) JVD    Gastrointestinal: (+) BS Q 4 quadrants    Genitourinary: oro in situ    Extremities: edema lower extremities and upper    Neurological: withdraws to pain and (+) gag pupils rt 5.0mm fixed left 3.0mm fixed    Skin: anasarca              T(C): 37.7, Max: 37.7 (08:00)  HR: 86 (78 - 96)  BP: 103/44 (80/36 - 124/53)  RR: 18 (9 - 24)  SpO2: 98% (77% - 100%)      LABS/STUDIES:  06-25    140  |  89<L>  |  115<HH>  ----------------------------<  121<H>  3.6   |  36<H>  |  1.8<H>    Ca    8.9      25 Jun 2018 04:17  Phos  3.6     06-24  Mg     2.7     06-25    TPro  6.7  /  Alb  3.1<L>  /  TBili  0.7  /  DBili  x   /  AST  17  /  ALT  7   /  AlkPhos  70  06-24                            9.0    12.67 )-----------( 123      ( 25 Jun 2018 04:17 )             29.0   HCT from 6/24/18    IMPRESSION:     1.  Acute to early subacute right-sided subdural hematoma along the right   convexity extending along anterior and posterior falx and right tentorium    measuring up to 1.6 cm.    2.  Associated mass effect with right to left midline shift of 1.0 cm and   effacement of sulci of the right cerebral hemisphere.      MEDICATIONS  (STANDING):  acetazolamide    Tablet 250 milliGRAM(s) Oral every 12 hours  ALBUTerol    90 MICROgram(s) HFA Inhaler 2 Puff(s) Inhalation every 6 hours  allopurinol 300 milliGRAM(s) Oral daily  atorvastatin 10 milliGRAM(s) Oral at bedtime  chlorhexidine 0.12% Liquid 15 milliLiter(s) Swish and Spit two times a day  digoxin     Tablet 0.125 milliGRAM(s) Oral daily  docusate sodium 100 milliGRAM(s) Oral two times a day  dorzolamide 2% Ophthalmic Solution 1 Drop(s) Both EYES three times a day  furosemide   Injectable 40 milliGRAM(s) IV Push two times a day  ipratropium 17 MICROgram(s) HFA Inhaler 1 Puff(s) Inhalation every 6 hours  latanoprost 0.005% Ophthalmic Solution 1 Drop(s) Both EYES at bedtime  levETIRAcetam  IVPB 500 milliGRAM(s) IV Intermittent every 12 hours  meropenem  IVPB      meropenem  IVPB 1000 milliGRAM(s) IV Intermittent every 12 hours  metoprolol succinate ER 25 milliGRAM(s) Oral daily  midazolam Infusion 0.02 mG/kG/Hr (1.57 mL/Hr) IV Continuous <Continuous>  norepinephrine Infusion 0.05 MICROgram(s)/kG/Min (7.359 mL/Hr) IV Continuous <Continuous>  pantoprazole  Injectable 40 milliGRAM(s) IV Push daily  senna 1 Tablet(s) Oral at bedtime  simethicone 80 milliGRAM(s) Chew three times a day  sodium chloride 3%. 500 milliLiter(s) (50 mL/Hr) IV Continuous <Continuous>  spironolactone 25 milliGRAM(s) Oral daily  timolol 0.25% Solution 1 Drop(s) Both EYES two times a day    MEDICATIONS  (PRN):              PPS (Palliative Performance Scale): 10

## 2018-06-25 NOTE — PROVIDER CONTACT NOTE (OTHER) - ACTION/TREATMENT ORDERED:
family contacted as per Neuro PA & MD Sullivan for consent for emergency craniotomy & evacuation of Rt SDH.

## 2018-06-25 NOTE — PROGRESS NOTE ADULT - SUBJECTIVE AND OBJECTIVE BOX
SUBJECTIVE:    Patient is a 77y old Female who presents with a chief complaint of Progressive SOB and lower extremity edema (2018 22:16)    Day 2 ICU  Code Status: DNR if brain dead liberate as per son's request.       Overnight Events:  Called by nurse to assess pt for change in mental status. Pt initially was lethargic but arousable. Pt is now only response to painful stimuli. Eyes fixed b/l. Neurosurgery called to reeval pt. Neurosurgery attending and PA spoke to son and he refused to give consent for surgery. Pt then became hypoxic and desaturated to 80's on BiPAP. Decision was made to intubate pt for airway protection and hypoxic resp failure. Spoke with son Tay and he agreed to intubation.    Patient was seen at bedside this AM  she minimally responds to painful stimuli. but is hemodynamically stable.     Additional information:   The patient is a 77-year-old female with a PMH of HTN, type II DM, CAD, A-Fib (on Eliquis), HFpEF, endocarditis, COPD (on 2 L home O2), DVT, CKD, GERD, and depression who presented from SNF with progressively increasing SOB and bilateral lower extremity edema. History was obtained from the patient's son who stated that over the past 3 weeks he noted his mother to have increased swelling of her legs. The patient was brought to the ED on  where she received diuretic therapy, Frazier catheter placement, and was discharged back to SNF. However, the patient continued to experience SOB and was found to be lethargic.     ED:  Upon current presentation, the patient was noted to be hypotensive, tachycardic, tachypneic, and hypoxic. She was placed on a NRB mask and subsequently switched to a venturi mask where her SpO2 was ~93%. She received nebulizer treatment, Lasix 40 mg IV, Zosyn, Vancomycin, and Azithromycin in the ED.    At baseline, the patient is alert and able to ambulate at her SNF.  Her son states that she has been evaluated in the hospital three times this year for similar complaints and has received increased amounts of Lasix.            PAST MEDICAL & SURGICAL HISTORY  MI, old  Endocarditis  Glaucoma  Diabetes mellitus, type 2  Anemia  Depression  Chronic obstructive pulmonary disease  Sepsis  Gastroesophageal reflux disease  Atrial fibrillation  Hypertension  Kidney failure  No significant past surgical history    SOCIAL HISTORY:  Negative for smoking/alcohol/drug use.     ALLERGIES:  chocolate (Unknown)  No Known Drug Allergies    MEDICATIONS:  STANDING MEDICATIONS  acetazolamide    Tablet 250 milliGRAM(s) Oral every 12 hours  ALBUTerol    90 MICROgram(s) HFA Inhaler 2 Puff(s) Inhalation every 6 hours  allopurinol 300 milliGRAM(s) Oral daily  atorvastatin 10 milliGRAM(s) Oral at bedtime  chlorhexidine 0.12% Liquid 15 milliLiter(s) Swish and Spit two times a day  digoxin     Tablet 0.125 milliGRAM(s) Oral daily  docusate sodium 100 milliGRAM(s) Oral two times a day  dorzolamide 2% Ophthalmic Solution 1 Drop(s) Both EYES three times a day  furosemide   Injectable 40 milliGRAM(s) IV Push two times a day  ipratropium 17 MICROgram(s) HFA Inhaler 1 Puff(s) Inhalation every 6 hours  latanoprost 0.005% Ophthalmic Solution 1 Drop(s) Both EYES at bedtime  levETIRAcetam  IVPB 500 milliGRAM(s) IV Intermittent every 12 hours  meropenem  IVPB      meropenem  IVPB 1000 milliGRAM(s) IV Intermittent every 12 hours  metoprolol succinate ER 25 milliGRAM(s) Oral daily  midazolam Infusion 0.02 mG/kG/Hr IV Continuous <Continuous>  norepinephrine Infusion 0.05 MICROgram(s)/kG/Min IV Continuous <Continuous>  pantoprazole  Injectable 40 milliGRAM(s) IV Push daily  senna 1 Tablet(s) Oral at bedtime  simethicone 80 milliGRAM(s) Chew three times a day  sodium chloride 3%. 500 milliLiter(s) IV Continuous <Continuous>  spironolactone 25 milliGRAM(s) Oral daily  timolol 0.25% Solution 1 Drop(s) Both EYES two times a day    PRN MEDICATIONS    VITALS:   T(F): 97.1  HR: 88  BP: 112/39  RR: 20  SpO2: 100%    PHYSICAL EXAM:  . General: NAD  .HEENT: Rt pupil fixed and dialated not reactive to light  · Respiratory: Breath Sounds equal & clear to  auscultation, no accessory muscle use  · Cardiovascular: Regular rate & rhythm, normal S1, S2; no murmurs, gallops or rubs; no S3, S4  · Gastrointestinal	Soft, non-tender, no hepatosplenomegaly, normal bowel sounds  · Genitourinary: Normal genitalia; no lesions; no discharge  · Extremities:	No cyanosis, clubbing or edema  · Skin no macular rashs, no lacerations.   . Neuro: sedated.    LABS:                        9.0    12.67 )-----------( 123      ( 2018 04:17 )             29.0         140  |  89<L>  |  115<HH>  ----------------------------<  121<H>  3.6   |  36<H>  |  1.8<H>    Ca    8.9      2018 04:17  Phos  3.6       Mg     2.7         TPro  6.7  /  Alb  3.1<L>  /  TBili  0.7  /  DBili  x   /  AST  17  /  ALT  7   /  AlkPhos  70      PT/INR - ( 2018 04:17 )   PT: 15.70 sec;   INR: 1.44 ratio         PTT - ( 2018 04:17 )  PTT:30.1 sec  Urinalysis Basic - ( 2018 20:05 )    Color: Yellow / Appearance: Cloudy / S.015 / pH: x  Gluc: x / Ketone: Negative  / Bili: Negative / Urobili: 0.2 mg/dL   Blood: x / Protein: 100 mg/dL / Nitrite: Negative   Leuk Esterase: Large / RBC: 25-50 /HPF / WBC 26-50 /HPF   Sq Epi: x / Non Sq Epi: Few /HPF / Bacteria: Moderate /HPF      ABG - ( 2018 04:31 )  pH, Arterial: 7.52  pH, Blood: x     /  pCO2: 48    /  pO2: 253   / HCO3: 39    / Base Excess: 15.3  /  SaO2: 101               Lactate, Blood: 1.3 mmol/L (18 @ 04:17)  Creatine Kinase, Serum: 18 U/L (18 @ 04:17)  Troponin T, Serum: 0.05 ng/mL <HH> (18 @ 04:17)  Lactate, Blood: 1.5 mmol/L (18 @ 21:09)      Culture - Urine (collected 2018 20:05)  Source: .Urine Catheterized  Preliminary Report (2018 20:46):    >100,000 CFU/ml Gram Negative Rods    Culture - Blood (collected 2018 19:28)  Source: .Blood Blood  Preliminary Report (2018 01:01):    No growth to date.    Culture - Blood (collected 2018 19:28)  Source: .Blood Blood  Preliminary Report (2018 01:01):    No growth to date.      CARDIAC MARKERS ( 2018 04:17 )  x     / 0.05 ng/mL / 18 U/L / x     / <1.0 ng/mL  CARDIAC MARKERS ( 2018 07:59 )  x     / 0.05 ng/mL / x     / x     / x      CARDIAC MARKERS ( 2018 19:33 )  x     / 0.07 ng/mL / 36 U/L / x     / 1.6 ng/mL          18 @ 07:01  -  18 @ 07:00  --------------------------------------------------------  IN: 707 mL / OUT: 2460 mL / NET: -1753 mL      Mode: AC/ CMV (Assist Control/ Continuous Mandatory Ventilation), RR (machine): 14, TV (machine): 450, FiO2: 40, PEEP: 5, ITime: 1, MAP: 11, PIP: 27      Radiology  < from: CT Head No Cont (18 @ 20:44) >  Findings:     There is an acute to early subacute right sided subdural hematoma along   the right convexitymeasuring up to 1.6 cm with associated mass effect   and right to left midline shift of 1.0 cm. The subdural hematoma extends   along the anterior and posterior falx and right cerebellar tentorium.    There is associated effacement of the sulcal pattern of the right   cerebral hemisphere. The left sulcal pattern is within normal limits for   the patient's stated age.      There are patchy areas of hypoattenuation in the periventricular and   subcortical white matter compatible with chronic microvascular ischemic   changes.    Grey-white differentiation is grossly preserved. Beam hardening artifact   is noted overlying the brain stem and posterior fossa which is inherent   to CT in this location.    The calvarium is intact.    The visualized paranasal sinuses and bilateral mastoid complexes are   unremarkable.       IMPRESSION:     1.  Acute to early subacute right-sided subdural hematoma along the right   convexity extending along anterior and posterior falx and right tentorium    measuring up to 1.6 cm.    2.  Associated mass effect with right to left midline shift of 1.0 cm and   effacement of sulci of the right cerebral hemisphere.      Dr. Ru Henao discussed preliminary findings with BRETT BRYAN   on 2018 at 8:48 PM with read back. SUBJECTIVE:    Patient is a 77y old Female who presents with a chief complaint of Progressive SOB and lower extremity edema (2018 22:16)    Day 2 ICU  Code Status: DNR if brain dead liberate as per son's request.       Overnight Events:  Called by nurse to assess pt for change in mental status. Pt initially was lethargic but arousable. Pt is now only response to painful stimuli. Eyes fixed b/l. Neurosurgery called to reeval pt. Neurosurgery attending and PA spoke to son and he refused to give consent for surgery. Pt then became hypoxic and desaturated to 80's on BiPAP. Decision was made to intubate pt for airway protection and hypoxic resp failure. Spoke with son Tay and he agreed to intubation.    Patient was seen at bedside this AM  she minimally responds to painful stimuli. but is hemodynamically stable.     Additional information:   The patient is a 77-year-old female with a PMH of HTN, type II DM, CAD, A-Fib (on Eliquis), HFpEF, endocarditis, COPD (on 2 L home O2), DVT, CKD, GERD, and depression who presented from SNF with progressively increasing SOB and bilateral lower extremity edema. History was obtained from the patient's son who stated that over the past 3 weeks he noted his mother to have increased swelling of her legs. The patient was brought to the ED on  where she received diuretic therapy, Frazier catheter placement, and was discharged back to SNF. However, the patient continued to experience SOB and was found to be lethargic.     ED:  Upon current presentation, the patient was noted to be hypotensive, tachycardic, tachypneic, and hypoxic. She was placed on a NRB mask and subsequently switched to a venturi mask where her SpO2 was ~93%. She received nebulizer treatment, Lasix 40 mg IV, Zosyn, Vancomycin, and Azithromycin in the ED.    At baseline, the patient is alert and able to ambulate at her SNF.  Her son states that she has been evaluated in the hospital three times this year for similar complaints and has received increased amounts of Lasix.            PAST MEDICAL & SURGICAL HISTORY  MI, old  Endocarditis  Glaucoma  Diabetes mellitus, type 2  Anemia  Depression  Chronic obstructive pulmonary disease  Sepsis  Gastroesophageal reflux disease  Atrial fibrillation  Hypertension  Kidney failure  No significant past surgical history    SOCIAL HISTORY:  Negative for smoking/alcohol/drug use.     ALLERGIES:  chocolate (Unknown)  No Known Drug Allergies    MEDICATIONS:  STANDING MEDICATIONS  acetazolamide    Tablet 250 milliGRAM(s) Oral every 12 hours  ALBUTerol    90 MICROgram(s) HFA Inhaler 2 Puff(s) Inhalation every 6 hours  allopurinol 300 milliGRAM(s) Oral daily  atorvastatin 10 milliGRAM(s) Oral at bedtime  chlorhexidine 0.12% Liquid 15 milliLiter(s) Swish and Spit two times a day  digoxin     Tablet 0.125 milliGRAM(s) Oral daily  docusate sodium 100 milliGRAM(s) Oral two times a day  dorzolamide 2% Ophthalmic Solution 1 Drop(s) Both EYES three times a day  furosemide   Injectable 40 milliGRAM(s) IV Push two times a day  ipratropium 17 MICROgram(s) HFA Inhaler 1 Puff(s) Inhalation every 6 hours  latanoprost 0.005% Ophthalmic Solution 1 Drop(s) Both EYES at bedtime  levETIRAcetam  IVPB 500 milliGRAM(s) IV Intermittent every 12 hours  meropenem  IVPB      meropenem  IVPB 1000 milliGRAM(s) IV Intermittent every 12 hours  metoprolol succinate ER 25 milliGRAM(s) Oral daily  midazolam Infusion 0.02 mG/kG/Hr IV Continuous <Continuous>  norepinephrine Infusion 0.05 MICROgram(s)/kG/Min IV Continuous <Continuous>  pantoprazole  Injectable 40 milliGRAM(s) IV Push daily  senna 1 Tablet(s) Oral at bedtime  simethicone 80 milliGRAM(s) Chew three times a day  sodium chloride 3%. 500 milliLiter(s) IV Continuous <Continuous>  spironolactone 25 milliGRAM(s) Oral daily  timolol 0.25% Solution 1 Drop(s) Both EYES two times a day    PRN MEDICATIONS    VITALS:   T(F): 97.1  HR: 88  BP: 112/39  RR: 20  SpO2: 100%    PHYSICAL EXAM:  . General: NAD  .HEENT: Rt pupil fixed and dialated non reactive to light  · Respiratory: Breath Sounds equal & clear to  auscultation, no accessory muscle use  · Cardiovascular: Regular rate & rhythm, normal S1, S2; no murmurs, gallops or rubs; no S3, S4  · Gastrointestinal	Soft, non-tender, no hepatosplenomegaly, normal bowel sounds  · Genitourinary: Normal genitalia; no lesions; no discharge  · Extremities:	No cyanosis, clubbing or edema, 2+ edema b/l LE  · Skin no macular rashs, no lacerations.   . Neuro: sedated.    LABS:                        9.0    12.67 )-----------( 123      ( 2018 04:17 )             29.0         140  |  89<L>  |  115<HH>  ----------------------------<  121<H>  3.6   |  36<H>  |  1.8<H>    Ca    8.9      2018 04:17  Phos  3.6       Mg     2.7         TPro  6.7  /  Alb  3.1<L>  /  TBili  0.7  /  DBili  x   /  AST  17  /  ALT  7   /  AlkPhos  70      PT/INR - ( 2018 04:17 )   PT: 15.70 sec;   INR: 1.44 ratio         PTT - ( 2018 04:17 )  PTT:30.1 sec  Urinalysis Basic - ( 2018 20:05 )    Color: Yellow / Appearance: Cloudy / S.015 / pH: x  Gluc: x / Ketone: Negative  / Bili: Negative / Urobili: 0.2 mg/dL   Blood: x / Protein: 100 mg/dL / Nitrite: Negative   Leuk Esterase: Large / RBC: 25-50 /HPF / WBC 26-50 /HPF   Sq Epi: x / Non Sq Epi: Few /HPF / Bacteria: Moderate /HPF      ABG - ( 2018 04:31 )  pH, Arterial: 7.52  pH, Blood: x     /  pCO2: 48    /  pO2: 253   / HCO3: 39    / Base Excess: 15.3  /  SaO2: 101               Lactate, Blood: 1.3 mmol/L (18 @ 04:17)  Creatine Kinase, Serum: 18 U/L (18 @ 04:17)  Troponin T, Serum: 0.05 ng/mL <HH> (18 @ 04:17)  Lactate, Blood: 1.5 mmol/L (18 @ 21:09)      Culture - Urine (collected 2018 20:05)  Source: .Urine Catheterized  Preliminary Report (2018 20:46):    >100,000 CFU/ml Gram Negative Rods    Culture - Blood (collected 2018 19:28)  Source: .Blood Blood  Preliminary Report (2018 01:01):    No growth to date.    Culture - Blood (collected 2018 19:28)  Source: .Blood Blood  Preliminary Report (2018 01:01):    No growth to date.      CARDIAC MARKERS ( 2018 04:17 )  x     / 0.05 ng/mL / 18 U/L / x     / <1.0 ng/mL  CARDIAC MARKERS ( 2018 07:59 )  x     / 0.05 ng/mL / x     / x     / x      CARDIAC MARKERS ( 2018 19:33 )  x     / 0.07 ng/mL / 36 U/L / x     / 1.6 ng/mL          18 @ 07:01  -  18 @ 07:00  --------------------------------------------------------  IN: 707 mL / OUT: 2460 mL / NET: -1753 mL      Mode: AC/ CMV (Assist Control/ Continuous Mandatory Ventilation), RR (machine): 14, TV (machine): 450, FiO2: 40, PEEP: 5, ITime: 1, MAP: 11, PIP: 27      Radiology  < from: CT Head No Cont (18 @ 20:44) >  Findings:     There is an acute to early subacute right sided subdural hematoma along   the right convexitymeasuring up to 1.6 cm with associated mass effect   and right to left midline shift of 1.0 cm. The subdural hematoma extends   along the anterior and posterior falx and right cerebellar tentorium.    There is associated effacement of the sulcal pattern of the right   cerebral hemisphere. The left sulcal pattern is within normal limits for   the patient's stated age.      There are patchy areas of hypoattenuation in the periventricular and   subcortical white matter compatible with chronic microvascular ischemic   changes.    Grey-white differentiation is grossly preserved. Beam hardening artifact   is noted overlying the brain stem and posterior fossa which is inherent   to CT in this location.    The calvarium is intact.    The visualized paranasal sinuses and bilateral mastoid complexes are   unremarkable.       IMPRESSION:     1.  Acute to early subacute right-sided subdural hematoma along the right   convexity extending along anterior and posterior falx and right tentorium    measuring up to 1.6 cm.    2.  Associated mass effect with right to left midline shift of 1.0 cm and   effacement of sulci of the right cerebral hemisphere.      Dr. Ru Henao discussed preliminary findings with BRETT BRYAN   on 2018 at 8:48 PM with read back. SUBJECTIVE:    Patient is a 77y old Female who presents with a chief complaint of Progressive SOB and lower extremity edema (2018 22:16)    Day 2 ICU  Code Status: DNR if brain dead liberate as per son's request.       Overnight Events:  Their was a change in mental status. Pt initially was lethargic but arousable. Pt is now only response to painful stimuli. Eyes fixed b/l. Neurosurgery called to reeval pt. Neurosurgery attending and PA spoke to son and he refused to give consent for surgery. Pt then became hypoxic and desaturated to 80's on BiPAP. Decision was made to intubate pt for airway protection and hypoxic resp failure. Spoke with son Tay and he agreed to intubation.    Patient was seen at bedside this AM  she minimally responds to painful stimuli. but is hemodynamically stable.     Additional information:   The patient is a 77-year-old female with a PMH of HTN, type II DM, CAD, A-Fib (on Eliquis), HFpEF, endocarditis, COPD (on 2 L home O2), DVT, CKD, GERD, and depression who presented from SNF with progressively increasing SOB and bilateral lower extremity edema. History was obtained from the patient's son who stated that over the past 3 weeks he noted his mother to have increased swelling of her legs. The patient was brought to the ED on  where she received diuretic therapy, Frazier catheter placement, and was discharged back to SNF. However, the patient continued to experience SOB and was found to be lethargic.     ED:  Upon current presentation, the patient was noted to be hypotensive, tachycardic, tachypneic, and hypoxic. She was placed on a NRB mask and subsequently switched to a venturi mask where her SpO2 was ~93%. She received nebulizer treatment, Lasix 40 mg IV, Zosyn, Vancomycin, and Azithromycin in the ED.    At baseline, the patient is alert and able to ambulate at her SNF.  Her son states that she has been evaluated in the hospital three times this year for similar complaints and has received increased amounts of Lasix.            PAST MEDICAL & SURGICAL HISTORY  MI, old  Endocarditis  Glaucoma  Diabetes mellitus, type 2  Anemia  Depression  Chronic obstructive pulmonary disease  Sepsis  Gastroesophageal reflux disease  Atrial fibrillation  Hypertension  Kidney failure  No significant past surgical history    SOCIAL HISTORY:  Negative for smoking/alcohol/drug use.     ALLERGIES:  chocolate (Unknown)  No Known Drug Allergies    MEDICATIONS:  STANDING MEDICATIONS  acetazolamide    Tablet 250 milliGRAM(s) Oral every 12 hours  ALBUTerol    90 MICROgram(s) HFA Inhaler 2 Puff(s) Inhalation every 6 hours  allopurinol 300 milliGRAM(s) Oral daily  atorvastatin 10 milliGRAM(s) Oral at bedtime  chlorhexidine 0.12% Liquid 15 milliLiter(s) Swish and Spit two times a day  digoxin     Tablet 0.125 milliGRAM(s) Oral daily  docusate sodium 100 milliGRAM(s) Oral two times a day  dorzolamide 2% Ophthalmic Solution 1 Drop(s) Both EYES three times a day  furosemide   Injectable 40 milliGRAM(s) IV Push two times a day  ipratropium 17 MICROgram(s) HFA Inhaler 1 Puff(s) Inhalation every 6 hours  latanoprost 0.005% Ophthalmic Solution 1 Drop(s) Both EYES at bedtime  levETIRAcetam  IVPB 500 milliGRAM(s) IV Intermittent every 12 hours  meropenem  IVPB      meropenem  IVPB 1000 milliGRAM(s) IV Intermittent every 12 hours  metoprolol succinate ER 25 milliGRAM(s) Oral daily  midazolam Infusion 0.02 mG/kG/Hr IV Continuous <Continuous>  norepinephrine Infusion 0.05 MICROgram(s)/kG/Min IV Continuous <Continuous>  pantoprazole  Injectable 40 milliGRAM(s) IV Push daily  senna 1 Tablet(s) Oral at bedtime  simethicone 80 milliGRAM(s) Chew three times a day  sodium chloride 3%. 500 milliLiter(s) IV Continuous <Continuous>  spironolactone 25 milliGRAM(s) Oral daily  timolol 0.25% Solution 1 Drop(s) Both EYES two times a day    PRN MEDICATIONS    VITALS:   T(F): 97.1  HR: 88  BP: 112/39  RR: 20  SpO2: 100%    PHYSICAL EXAM:  . General: NAD  .HEENT: Rt pupil fixed and dialated non reactive to light  · Respiratory: Breath Sounds equal & clear to  auscultation, no accessory muscle use  · Cardiovascular: Regular rate & rhythm, normal S1, S2; no murmurs, gallops or rubs; no S3, S4  · Gastrointestinal	Soft, non-tender, no hepatosplenomegaly, normal bowel sounds  · Genitourinary: Normal genitalia; no lesions; no discharge  · Extremities:	No cyanosis, clubbing or edema, 2+ edema b/l LE  · Skin no macular rashs, no lacerations.   . Neuro: sedated.    LABS:                        9.0    12.67 )-----------( 123      ( 2018 04:17 )             29.0         140  |  89<L>  |  115<HH>  ----------------------------<  121<H>  3.6   |  36<H>  |  1.8<H>    Ca    8.9      2018 04:17  Phos  3.6       Mg     2.7         TPro  6.7  /  Alb  3.1<L>  /  TBili  0.7  /  DBili  x   /  AST  17  /  ALT  7   /  AlkPhos  70      PT/INR - ( 2018 04:17 )   PT: 15.70 sec;   INR: 1.44 ratio         PTT - ( 2018 04:17 )  PTT:30.1 sec  Urinalysis Basic - ( 2018 20:05 )    Color: Yellow / Appearance: Cloudy / S.015 / pH: x  Gluc: x / Ketone: Negative  / Bili: Negative / Urobili: 0.2 mg/dL   Blood: x / Protein: 100 mg/dL / Nitrite: Negative   Leuk Esterase: Large / RBC: 25-50 /HPF / WBC 26-50 /HPF   Sq Epi: x / Non Sq Epi: Few /HPF / Bacteria: Moderate /HPF      ABG - ( 2018 04:31 )  pH, Arterial: 7.52  pH, Blood: x     /  pCO2: 48    /  pO2: 253   / HCO3: 39    / Base Excess: 15.3  /  SaO2: 101               Lactate, Blood: 1.3 mmol/L (18 @ 04:17)  Creatine Kinase, Serum: 18 U/L (18 @ 04:17)  Troponin T, Serum: 0.05 ng/mL <HH> (18 @ 04:17)  Lactate, Blood: 1.5 mmol/L (18 @ 21:09)      Culture - Urine (collected 2018 20:05)  Source: .Urine Catheterized  Preliminary Report (2018 20:46):    >100,000 CFU/ml Gram Negative Rods    Culture - Blood (collected 2018 19:28)  Source: .Blood Blood  Preliminary Report (2018 01:01):    No growth to date.    Culture - Blood (collected 2018 19:28)  Source: .Blood Blood  Preliminary Report (2018 01:01):    No growth to date.      CARDIAC MARKERS ( 2018 04:17 )  x     / 0.05 ng/mL / 18 U/L / x     / <1.0 ng/mL  CARDIAC MARKERS ( 2018 07:59 )  x     / 0.05 ng/mL / x     / x     / x      CARDIAC MARKERS ( 2018 19:33 )  x     / 0.07 ng/mL / 36 U/L / x     / 1.6 ng/mL          18 @ 07:01  -  18 @ 07:00  --------------------------------------------------------  IN: 707 mL / OUT: 2460 mL / NET: -1753 mL      Mode: AC/ CMV (Assist Control/ Continuous Mandatory Ventilation), RR (machine): 14, TV (machine): 450, FiO2: 40, PEEP: 5, ITime: 1, MAP: 11, PIP: 27      Radiology  < from: CT Head No Cont (18 @ 20:44) >  Findings:     There is an acute to early subacute right sided subdural hematoma along   the right convexitymeasuring up to 1.6 cm with associated mass effect   and right to left midline shift of 1.0 cm. The subdural hematoma extends   along the anterior and posterior falx and right cerebellar tentorium.    There is associated effacement of the sulcal pattern of the right   cerebral hemisphere. The left sulcal pattern is within normal limits for   the patient's stated age.      There are patchy areas of hypoattenuation in the periventricular and   subcortical white matter compatible with chronic microvascular ischemic   changes.    Grey-white differentiation is grossly preserved. Beam hardening artifact   is noted overlying the brain stem and posterior fossa which is inherent   to CT in this location.    The calvarium is intact.    The visualized paranasal sinuses and bilateral mastoid complexes are   unremarkable.       IMPRESSION:     1.  Acute to early subacute right-sided subdural hematoma along the right   convexity extending along anterior and posterior falx and right tentorium    measuring up to 1.6 cm.    2.  Associated mass effect with right to left midline shift of 1.0 cm and   effacement of sulci of the right cerebral hemisphere.      Dr. Ru Henao discussed preliminary findings with BRETT BRYAN   on 2018 at 8:48 PM with read back. SUBJECTIVE:    Patient is a 77y old Female who presents with a chief complaint of Progressive SOB and lower extremity edema (2018 22:16)    Day 2 ICU  Code Status: DNR if brain dead liberate as per son's request.       Overnight Events:  Their was a change in mental status. Pt initially was lethargic but arousable. Pt is now only response to painful stimuli. Eyes fixed b/l. Neurosurgery called to reeval pt. Neurosurgery attending and PA spoke to son and he refused to give consent for surgery. Pt then became hypoxic and desaturated to 80's on BiPAP. Decision was made to intubate pt for airway protection and hypoxic resp failure. Spoke with son Tay and he agreed to intubation.    Patient was seen at bedside this AM  she minimally responds to painful stimuli. but is hemodynamically stable.     Additional information:   The patient is a 77-year-old female with a PMH of HTN, type II DM, CAD, A-Fib (on Eliquis), HFpEF, endocarditis, COPD (on 2 L home O2), DVT, CKD, GERD, and depression who presented from SNF with progressively increasing SOB and bilateral lower extremity edema. History was obtained from the patient's son who stated that over the past 3 weeks he noted his mother to have increased swelling of her legs. The patient was brought to the ED on  where she received diuretic therapy, Frazier catheter placement, and was discharged back to SNF. However, the patient continued to experience SOB and was found to be lethargic.     ED:  Upon current presentation, the patient was noted to be hypotensive, tachycardic, tachypneic, and hypoxic. She was placed on a NRB mask and subsequently switched to a venturi mask where her SpO2 was ~93%. She received nebulizer treatment, Lasix 40 mg IV, Zosyn, Vancomycin, and Azithromycin in the ED.    At baseline, the patient is alert and able to ambulate at her SNF.  Her son states that she has been evaluated in the hospital three times this year for similar complaints and has received increased amounts of Lasix.            PAST MEDICAL & SURGICAL HISTORY  MI, old  Endocarditis  Glaucoma  Diabetes mellitus, type 2  Anemia  Depression  Chronic obstructive pulmonary disease  Sepsis  Gastroesophageal reflux disease  Atrial fibrillation  Hypertension  Kidney failure  No significant past surgical history    SOCIAL HISTORY:  Negative for smoking/alcohol/drug use.     ALLERGIES:  chocolate (Unknown)  No Known Drug Allergies    MEDICATIONS:  STANDING MEDICATIONS  acetazolamide    Tablet 250 milliGRAM(s) Oral every 12 hours  ALBUTerol    90 MICROgram(s) HFA Inhaler 2 Puff(s) Inhalation every 6 hours  allopurinol 300 milliGRAM(s) Oral daily  atorvastatin 10 milliGRAM(s) Oral at bedtime  chlorhexidine 0.12% Liquid 15 milliLiter(s) Swish and Spit two times a day  digoxin     Tablet 0.125 milliGRAM(s) Oral daily  docusate sodium 100 milliGRAM(s) Oral two times a day  dorzolamide 2% Ophthalmic Solution 1 Drop(s) Both EYES three times a day  furosemide   Injectable 40 milliGRAM(s) IV Push two times a day  ipratropium 17 MICROgram(s) HFA Inhaler 1 Puff(s) Inhalation every 6 hours  latanoprost 0.005% Ophthalmic Solution 1 Drop(s) Both EYES at bedtime  levETIRAcetam  IVPB 500 milliGRAM(s) IV Intermittent every 12 hours  meropenem  IVPB      meropenem  IVPB 1000 milliGRAM(s) IV Intermittent every 12 hours  metoprolol succinate ER 25 milliGRAM(s) Oral daily  midazolam Infusion 0.02 mG/kG/Hr IV Continuous <Continuous>  norepinephrine Infusion 0.05 MICROgram(s)/kG/Min IV Continuous <Continuous>  pantoprazole  Injectable 40 milliGRAM(s) IV Push daily  senna 1 Tablet(s) Oral at bedtime  simethicone 80 milliGRAM(s) Chew three times a day  sodium chloride 3%. 500 milliLiter(s) IV Continuous <Continuous>  spironolactone 25 milliGRAM(s) Oral daily  timolol 0.25% Solution 1 Drop(s) Both EYES two times a day    PRN MEDICATIONS    VITALS:   T(F): 97.1  HR: 88  BP: 112/39  RR: 20  SpO2: 100%    PHYSICAL EXAM:  . General: NAD  .HEENT: Rt pupil fixed and dialated non reactive to light  · Respiratory: Breath Sounds equal & clear to  auscultation, intubated MV  · Cardiovascular: Regular rate & rhythm, normal S1, S2; no murmurs, gallops or rubs; no S3, S4  · Gastrointestinal: Soft, non-tender, normal bowel sounds  · Genitourinary: Normal genitalia; no lesions; no discharge  · Extremities:	No cyanosis, clubbing or edema, 2+ edema b/l LE  · Skin no macular rashs, no lacerations.   . Neuro: sedated.    LABS:                        9.0    12.67 )-----------( 123      ( 2018 04:17 )             29.0         140  |  89<L>  |  115<HH>  ----------------------------<  121<H>  3.6   |  36<H>  |  1.8<H>    Ca    8.9      2018 04:17  Phos  3.6       Mg     2.7         TPro  6.7  /  Alb  3.1<L>  /  TBili  0.7  /  DBili  x   /  AST  17  /  ALT  7   /  AlkPhos  70      PT/INR - ( 2018 04:17 )   PT: 15.70 sec;   INR: 1.44 ratio         PTT - ( 2018 04:17 )  PTT:30.1 sec  Urinalysis Basic - ( 2018 20:05 )    Color: Yellow / Appearance: Cloudy / S.015 / pH: x  Gluc: x / Ketone: Negative  / Bili: Negative / Urobili: 0.2 mg/dL   Blood: x / Protein: 100 mg/dL / Nitrite: Negative   Leuk Esterase: Large / RBC: 25-50 /HPF / WBC 26-50 /HPF   Sq Epi: x / Non Sq Epi: Few /HPF / Bacteria: Moderate /HPF      ABG - ( 2018 04:31 )  pH, Arterial: 7.52  pH, Blood: x     /  pCO2: 48    /  pO2: 253   / HCO3: 39    / Base Excess: 15.3  /  SaO2: 101               Lactate, Blood: 1.3 mmol/L (18 @ 04:17)  Creatine Kinase, Serum: 18 U/L (18 @ 04:17)  Troponin T, Serum: 0.05 ng/mL <HH> (18 @ 04:17)  Lactate, Blood: 1.5 mmol/L (18 @ 21:09)      Culture - Urine (collected 2018 20:05)  Source: .Urine Catheterized  Preliminary Report (2018 20:46):    >100,000 CFU/ml Gram Negative Rods    Culture - Blood (collected 2018 19:28)  Source: .Blood Blood  Preliminary Report (2018 01:01):    No growth to date.    Culture - Blood (collected 2018 19:28)  Source: .Blood Blood  Preliminary Report (2018 01:01):    No growth to date.      CARDIAC MARKERS ( 2018 04:17 )  x     / 0.05 ng/mL / 18 U/L / x     / <1.0 ng/mL  CARDIAC MARKERS ( 2018 07:59 )  x     / 0.05 ng/mL / x     / x     / x      CARDIAC MARKERS ( 2018 19:33 )  x     / 0.07 ng/mL / 36 U/L / x     / 1.6 ng/mL          18 @ 07:01  -  18 @ 07:00  --------------------------------------------------------  IN: 707 mL / OUT: 2460 mL / NET: -1753 mL      Mode: AC/ CMV (Assist Control/ Continuous Mandatory Ventilation), RR (machine): 14, TV (machine): 450, FiO2: 40, PEEP: 5, ITime: 1, MAP: 11, PIP: 27      Radiology  < from: CT Head No Cont (18 @ 20:44) >  Findings:     There is an acute to early subacute right sided subdural hematoma along   the right convexitymeasuring up to 1.6 cm with associated mass effect   and right to left midline shift of 1.0 cm. The subdural hematoma extends   along the anterior and posterior falx and right cerebellar tentorium.    There is associated effacement of the sulcal pattern of the right   cerebral hemisphere. The left sulcal pattern is within normal limits for   the patient's stated age.      There are patchy areas of hypoattenuation in the periventricular and   subcortical white matter compatible with chronic microvascular ischemic   changes.    Grey-white differentiation is grossly preserved. Beam hardening artifact   is noted overlying the brain stem and posterior fossa which is inherent   to CT in this location.    The calvarium is intact.    The visualized paranasal sinuses and bilateral mastoid complexes are   unremarkable.       IMPRESSION:     1.  Acute to early subacute right-sided subdural hematoma along the right   convexity extending along anterior and posterior falx and right tentorium    measuring up to 1.6 cm.    2.  Associated mass effect with right to left midline shift of 1.0 cm and   effacement of sulci of the right cerebral hemisphere.      Dr. Ru Henao discussed preliminary findings with BRETT BYRAN   on 2018 at 8:48 PM with read back.

## 2018-06-26 LAB
ALBUMIN SERPL ELPH-MCNC: 2.6 G/DL — LOW (ref 3.5–5.2)
ALP SERPL-CCNC: 52 U/L — SIGNIFICANT CHANGE UP (ref 30–115)
ALT FLD-CCNC: 5 U/L — SIGNIFICANT CHANGE UP (ref 0–41)
ANION GAP SERPL CALC-SCNC: 14 MMOL/L — SIGNIFICANT CHANGE UP (ref 7–14)
ANION GAP SERPL CALC-SCNC: 17 MMOL/L — HIGH (ref 7–14)
ANION GAP SERPL CALC-SCNC: 17 MMOL/L — HIGH (ref 7–14)
AST SERPL-CCNC: 13 U/L — SIGNIFICANT CHANGE UP (ref 0–41)
BASE EXCESS BLDA CALC-SCNC: 12.3 MMOL/L — HIGH (ref -2–2)
BILIRUB DIRECT SERPL-MCNC: 0.3 MG/DL — HIGH (ref 0–0.2)
BILIRUB INDIRECT FLD-MCNC: 0.4 MG/DL — SIGNIFICANT CHANGE UP (ref 0.2–1.2)
BILIRUB SERPL-MCNC: 0.7 MG/DL — SIGNIFICANT CHANGE UP (ref 0.2–1.2)
BUN SERPL-MCNC: 100 MG/DL — CRITICAL HIGH (ref 10–20)
BUN SERPL-MCNC: 107 MG/DL — CRITICAL HIGH (ref 10–20)
BUN SERPL-MCNC: 98 MG/DL — CRITICAL HIGH (ref 10–20)
CALCIUM SERPL-MCNC: 8.9 MG/DL — SIGNIFICANT CHANGE UP (ref 8.5–10.1)
CALCIUM SERPL-MCNC: 9 MG/DL — SIGNIFICANT CHANGE UP (ref 8.5–10.1)
CALCIUM SERPL-MCNC: 9 MG/DL — SIGNIFICANT CHANGE UP (ref 8.5–10.1)
CHLORIDE SERPL-SCNC: 101 MMOL/L — SIGNIFICANT CHANGE UP (ref 98–110)
CHLORIDE SERPL-SCNC: 106 MMOL/L — SIGNIFICANT CHANGE UP (ref 98–110)
CHLORIDE SERPL-SCNC: 108 MMOL/L — SIGNIFICANT CHANGE UP (ref 98–110)
CO2 SERPL-SCNC: 32 MMOL/L — SIGNIFICANT CHANGE UP (ref 17–32)
CO2 SERPL-SCNC: 33 MMOL/L — HIGH (ref 17–32)
CO2 SERPL-SCNC: 34 MMOL/L — HIGH (ref 17–32)
CREAT SERPL-MCNC: 1.7 MG/DL — HIGH (ref 0.7–1.5)
CREAT SERPL-MCNC: 1.7 MG/DL — HIGH (ref 0.7–1.5)
CREAT SERPL-MCNC: 1.8 MG/DL — HIGH (ref 0.7–1.5)
GLUCOSE SERPL-MCNC: 151 MG/DL — HIGH (ref 70–99)
GLUCOSE SERPL-MCNC: 154 MG/DL — HIGH (ref 70–99)
GLUCOSE SERPL-MCNC: 159 MG/DL — HIGH (ref 70–99)
HCO3 BLDA-SCNC: 35 MMOL/L — HIGH (ref 23–27)
HCT VFR BLD CALC: 28.8 % — LOW (ref 37–47)
HGB BLD-MCNC: 8.7 G/DL — LOW (ref 12–16)
HOROWITZ INDEX BLDA+IHG-RTO: 40 — SIGNIFICANT CHANGE UP
MAGNESIUM SERPL-MCNC: 2.6 MG/DL — HIGH (ref 1.8–2.4)
MCHC RBC-ENTMCNC: 30.2 G/DL — LOW (ref 32–37)
MCHC RBC-ENTMCNC: 32.6 PG — HIGH (ref 27–31)
MCV RBC AUTO: 107.9 FL — HIGH (ref 81–99)
NRBC # BLD: 0 /100 WBCS — SIGNIFICANT CHANGE UP (ref 0–0)
PCO2 BLDA: 36 MMHG — LOW (ref 38–42)
PH BLDA: 7.6 — HIGH (ref 7.38–7.42)
PLATELET # BLD AUTO: 124 K/UL — LOW (ref 130–400)
PO2 BLDA: 132 MMHG — HIGH (ref 78–95)
POTASSIUM SERPL-MCNC: 2.8 MMOL/L — LOW (ref 3.5–5)
POTASSIUM SERPL-MCNC: 2.8 MMOL/L — LOW (ref 3.5–5)
POTASSIUM SERPL-MCNC: 2.9 MMOL/L — LOW (ref 3.5–5)
POTASSIUM SERPL-SCNC: 2.8 MMOL/L — LOW (ref 3.5–5)
POTASSIUM SERPL-SCNC: 2.8 MMOL/L — LOW (ref 3.5–5)
POTASSIUM SERPL-SCNC: 2.9 MMOL/L — LOW (ref 3.5–5)
PROT SERPL-MCNC: 5.9 G/DL — LOW (ref 6–8)
RBC # BLD: 2.67 M/UL — LOW (ref 4.2–5.4)
RBC # FLD: 15.4 % — HIGH (ref 11.5–14.5)
SAO2 % BLDA: 99 % — HIGH (ref 94–98)
SODIUM SERPL-SCNC: 151 MMOL/L — HIGH (ref 135–146)
SODIUM SERPL-SCNC: 154 MMOL/L — HIGH (ref 135–146)
SODIUM SERPL-SCNC: 157 MMOL/L — HIGH (ref 135–146)
WBC # BLD: 10.4 K/UL — SIGNIFICANT CHANGE UP (ref 4.8–10.8)
WBC # FLD AUTO: 10.4 K/UL — SIGNIFICANT CHANGE UP (ref 4.8–10.8)

## 2018-06-26 RX ORDER — POTASSIUM CHLORIDE 20 MEQ
10 PACKET (EA) ORAL
Qty: 0 | Refills: 0 | Status: COMPLETED | OUTPATIENT
Start: 2018-06-26 | End: 2018-06-26

## 2018-06-26 RX ORDER — CIPROFLOXACIN LACTATE 400MG/40ML
200 VIAL (ML) INTRAVENOUS EVERY 12 HOURS
Qty: 0 | Refills: 0 | Status: DISCONTINUED | OUTPATIENT
Start: 2018-06-26 | End: 2018-06-26

## 2018-06-26 RX ORDER — MORPHINE SULFATE 50 MG/1
4 CAPSULE, EXTENDED RELEASE ORAL ONCE
Qty: 0 | Refills: 0 | Status: DISCONTINUED | OUTPATIENT
Start: 2018-06-26 | End: 2018-06-26

## 2018-06-26 RX ORDER — CIPROFLOXACIN LACTATE 400MG/40ML
200 VIAL (ML) INTRAVENOUS ONCE
Qty: 0 | Refills: 0 | Status: COMPLETED | OUTPATIENT
Start: 2018-06-26 | End: 2018-06-26

## 2018-06-26 RX ORDER — FENTANYL CITRATE 50 UG/ML
0.5 INJECTION INTRAVENOUS
Qty: 2500 | Refills: 0 | Status: DISCONTINUED | OUTPATIENT
Start: 2018-06-26 | End: 2018-06-26

## 2018-06-26 RX ORDER — MORPHINE SULFATE 50 MG/1
4 CAPSULE, EXTENDED RELEASE ORAL
Qty: 100 | Refills: 0 | Status: DISCONTINUED | OUTPATIENT
Start: 2018-06-26 | End: 2018-06-26

## 2018-06-26 RX ORDER — CIPROFLOXACIN LACTATE 400MG/40ML
400 VIAL (ML) INTRAVENOUS EVERY 12 HOURS
Qty: 0 | Refills: 0 | Status: DISCONTINUED | OUTPATIENT
Start: 2018-06-26 | End: 2018-06-26

## 2018-06-26 RX ORDER — ROBINUL 0.2 MG/ML
0.4 INJECTION INTRAMUSCULAR; INTRAVENOUS ONCE
Qty: 0 | Refills: 0 | Status: COMPLETED | OUTPATIENT
Start: 2018-06-26 | End: 2018-06-26

## 2018-06-26 RX ORDER — CIPROFLOXACIN LACTATE 400MG/40ML
VIAL (ML) INTRAVENOUS
Qty: 0 | Refills: 0 | Status: DISCONTINUED | OUTPATIENT
Start: 2018-06-26 | End: 2018-06-26

## 2018-06-26 RX ADMIN — Medication 100 MILLIEQUIVALENT(S): at 09:46

## 2018-06-26 RX ADMIN — DORZOLAMIDE HYDROCHLORIDE 1 DROP(S): 20 SOLUTION/ DROPS OPHTHALMIC at 05:05

## 2018-06-26 RX ADMIN — MEROPENEM 100 MILLIGRAM(S): 1 INJECTION INTRAVENOUS at 05:04

## 2018-06-26 RX ADMIN — Medication 300 MILLIGRAM(S): at 12:45

## 2018-06-26 RX ADMIN — Medication 100 MILLIEQUIVALENT(S): at 07:33

## 2018-06-26 RX ADMIN — LEVETIRACETAM 420 MILLIGRAM(S): 250 TABLET, FILM COATED ORAL at 05:04

## 2018-06-26 RX ADMIN — Medication 100 MILLIGRAM(S): at 09:46

## 2018-06-26 RX ADMIN — CHLORHEXIDINE GLUCONATE 15 MILLILITER(S): 213 SOLUTION TOPICAL at 05:04

## 2018-06-26 RX ADMIN — Medication 1 DROP(S): at 05:05

## 2018-06-26 RX ADMIN — ROBINUL 0.4 MILLIGRAM(S): 0.2 INJECTION INTRAMUSCULAR; INTRAVENOUS at 13:37

## 2018-06-26 RX ADMIN — Medication 100 MILLIEQUIVALENT(S): at 08:42

## 2018-06-26 RX ADMIN — MORPHINE SULFATE 4 MG/HR: 50 CAPSULE, EXTENDED RELEASE ORAL at 13:34

## 2018-06-26 RX ADMIN — PANTOPRAZOLE SODIUM 40 MILLIGRAM(S): 20 TABLET, DELAYED RELEASE ORAL at 12:45

## 2018-06-26 RX ADMIN — Medication 100 MILLIGRAM(S): at 05:04

## 2018-06-26 RX ADMIN — ACETAZOLAMIDE 250 MILLIGRAM(S): 250 TABLET ORAL at 05:04

## 2018-06-26 RX ADMIN — Medication 0.12 MILLIGRAM(S): at 05:04

## 2018-06-26 RX ADMIN — MORPHINE SULFATE 4 MILLIGRAM(S): 50 CAPSULE, EXTENDED RELEASE ORAL at 13:38

## 2018-06-26 RX ADMIN — Medication 1 MILLIGRAM(S): at 13:37

## 2018-06-26 NOTE — CHART NOTE - NSCHARTNOTEFT_GEN_A_CORE
Patient is a 77y old Female subacute SDH right to left midline shift , extensive  pmh  of HTN, type II DM, CAD, A-Fib (on Eliquis), HFpEF, endocarditis, COPD (on 2 L home O2), DVT, CKD, GERD, and depression who presented with a chief complaint of Progressive SOB and lower extremity edema. Pt. comes from nursing home s/p fall on Eliquis 2.5mg.        ED: In  the  ED the patient was noted to be hypotensive, tachycardic, tachypneic, and hypoxic. She was placed on a NRB mask and subsequently switched to a venturi mask where her SpO2 was ~93%. She received nebulizer treatment, Lasix 40 mg IV, Zosyn, Vancomycin, and Azithromycin in the ED.      On 06/24/18  pt  was admitted to the ICU where she was given a single does of PCC for  anti coag reversal. CT  head showed subacute right-sided SDH extending into anterior and posterior falx and right cerebellar region 1.6 cm in size, with right to left midline shift of 6 mm.     On 06/24/18  over night their was a change in mental status. Pt initially was lethargic but arousable. Pt was only responsive to painful stimuli. Eyes fixed b/l. Neurosurgery called to reeval pt. Neurosurgery attending and PA spoke to son and he refused to give consent for surgery. Pt then became hypoxic and desaturated to 80's on BiPAP. Decision was made to intubate pt for airway protection and HRF. Overnight resident spoke with daya Cross and he agreed to intubation.      06/26/18 -Currently hemodynamically stable intubated and sedated on 0.5mg of fentanyl. She remains in  ICU on MV, Right eye fixed and dilated with no pupillary reflex. S/p positive Urinary Catheter culture. Urinary Catheter was changed after positive culture specimen drawn on 06/24/18. Patient has very poor prognosis. Palliative care has spoken with son about possible liberation on 06/26/18.             Impression:   HF with preserved EF 50%  UTI- Citrobacter freundii + 06/23/18  SDH with rt to left shift  AHRF      CNS: Consult palliative Care.  -D/C versed  - f/u CT head n/c  -Keppra 500mg BID  - REEG  - Neurosurg --- no further intervention   - on Fentanyl  as needed   -Keep head above 45 degrees    Lungs: M/V   - adjust endotracheal tube  - f/u abg  - F/u palliative Care consults      CVS:   - c/w Levophed c/w   - c/w digoxin .125 mg    - c/w atorvastatin     ID:  - F/U ID  - Cx + Cathater + for Citrobacter freundii +  -D/C Meropenem IV 1000 mg  - Ciprofloxaxin 200 mg       Renal:  -f/u lytes 11:00---> replete as needed   - today  Na+ 157-----Held  3% sodium chloride  - D/C Lasix 40mg----K+ 2.7  - C/W spironolactone  - 30 Meq of K+ given 0640 am    DVT prfx:  - scd    GI:   - c/w Protonix  40 mg pfx    Possible Down grade!      Floor Team:

## 2018-06-26 NOTE — PROGRESS NOTE ADULT - ASSESSMENT
Patient is a 77y old Female with extensive pmh who presented with a chief complaint of Progressive SOB and lower extremity edema. Pt. comes from nursing home s/p fall on Eliquis 2.5mg. CT  head showed subacute right-sided SDH extending into anterior and posterior falx and right cerebellar region 1.6 cm in size, with right to left midline shift of 6 mm. Currently hemodynamically stable in intubated and sedated. In ICU Unresponsive, MV, on sedation  with 100 Mg of Versed and pressure maintained with 8 mg drip levophed. Right eye fixed and dilated. Patient poor prognosis. Today S/p positive Urinary Catheter culture.      Impression:   UTI- Citrobacter freundii +  SDH with rt to left shift  AHRF      CNS:  -Versed c/w sedation  -CT head n/c  -Keppra 500mg BID  - REEG  - f/u Neurosurg  -Keep head above 45 degrees      CVS:   - c/w Levophed c/w   - c/w digoxin .125 mg    - c/w atorvastatin     ID:  - F/U ID  - Cx + Cathater + for Citrobacter freundii +  -C/w Meropenem IV 1000 mg    Renal:  -f/u lytes q4  - today  Na+ 157-----Held  3% sodium chloride  - Held Lasix 40mg----K+ 2.7  - Held spironolactone  - 30 Meq of K+ given 0640 am    DVT prfx:  - scd    GI:   - c/w Protonix  40 mg pfx Patient is a 77y old Female with extensive pmh who presented with a chief complaint of Progressive SOB and lower extremity edema. Pt. comes from nursing home s/p fall on Eliquis 2.5mg. CT  head showed subacute right-sided SDH extending into anterior and posterior falx and right cerebellar region 1.6 cm in size, with right to left midline shift of 6 mm. Currently hemodynamically stable in intubated and sedated. In ICU Unresponsive, MV, on sedation  with 100 Mg of Versed and pressure maintained with 8 mg drip levophed. Right eye fixed and dilated. Patient poor prognosis. Today S/p positive Urinary Catheter culture. Patient has very poor prognosis. Palliative care has spoken with son about possible liberation.     Impression:   UTI- Citrobacter freundii +  SDH with rt to left shift  AHRF      CNS: Consult palliative Care.  -D/C versed  - f/u CT head n/c  -Keppra 500mg BID  - REEG  - Neurosurg --- no further intervention   - on Fentanyl  as needed   -Keep head above 45 degrees    Lungs: M/V   - adjust endotracheal tube  - f/u abg      CVS:   - c/w Levophed c/w   - c/w digoxin .125 mg    - c/w atorvastatin     ID:  - F/U ID  - Cx + Cathater + for Citrobacter freundii +  -D/C Meropenem IV 1000 mg  - Ciprofloxaxin 200 mg       Renal:  -f/u lytes 11:00---> replete as needed   - today  Na+ 157-----Held  3% sodium chloride  - D/C Lasix 40mg----K+ 2.7  - C/W spironolactone  - 30 Meq of K+ given 0640 am    DVT prfx:  - scd    GI:   - c/w Protonix  40 mg pfx    Possible Down grade! Patient is a 77y old Female with extensive pmh who presented with a chief complaint of Progressive SOB and lower extremity edema. Pt. comes from nursing home s/p fall on Eliquis 2.5mg. CT  head showed subacute right-sided SDH extending into anterior and posterior falx and right cerebellar region 1.6 cm in size, with right to left midline shift of 6 mm. Currently hemodynamically stable in intubated and sedated. In ICU , MV, on sedation  with fentanyl and pressure maintained with 8 mg drip levophed as needed. Right eye fixed and dilated. Patient poor prognosis. Today S/p positive Urinary Catheter culture. Palliative care has spoken with son about possible liberation.     Impression:   UTI- Citrobacter freundii +  SDH with rt to left shift  AHRF      CNS: Consult palliative Care.  -D/C versed  - f/u CT head n/c  -Keppra 500mg BID  - REEG  - Neurosurg --- no further intervention   - on Fentanyl  as needed   -Keep head above 45 degrees    Lungs: M/V   - adjust endotracheal tube  - f/u abg      CVS:   - c/w Levophed c/w   - c/w digoxin .125 mg    - c/w atorvastatin     ID:  - F/U ID  - Cx + Cathater + for Citrobacter freundii +  -D/C Meropenem IV 1000 mg  - Ciprofloxaxin 200 mg       Renal:  -f/u lytes 11:00---> replete as needed   - today  Na+ 157-----Held  3% sodium chloride  - D/C Lasix 40mg----K+ 2.7  - C/W spironolactone  - 30 Meq of K+ given 0640 am    DVT prfx:  - scd    GI:   - c/w Protonix  40 mg pfx    Possible Down grade!

## 2018-06-26 NOTE — PROGRESS NOTE ADULT - SUBJECTIVE AND OBJECTIVE BOX
OVERNIGHT EVENTS: still intubated/ ventilated/ off hypertonic, was on versed, s/p neurosx/ palliative care eval    Vital Signs Last 24 Hrs  T(C): 37.7 (26 Jun 2018 04:00), Max: 38.3 (25 Jun 2018 16:00)  T(F): 99.8 (26 Jun 2018 04:00), Max: 100.9 (25 Jun 2018 16:00)  HR: 90 (26 Jun 2018 07:00) (86 - 118)  BP: 105/50 (26 Jun 2018 07:00) (91/42 - 131/42)  BP(mean): 75 (26 Jun 2018 07:00) (61 - 92)  RR: 20 (26 Jun 2018 07:00) (14 - 32)  SpO2: 99% (26 Jun 2018 07:35) (98% - 100%)    PHYSICAL EXAMINATION:    GENERAL: not following commands    HEENT: Head is normocephalic and atraumatic. Extraocular muscles are intact. Mucous membranes are moist.    NECK: Supple.    LUNGS: dec bs r side    HEART: Regular rate and rhythm without murmur.    ABDOMEN: Soft, nontender, and nondistended.      EXTREMITIES: Without any cyanosis, clubbing, rash, lesions or edema.    neuro: unchanged    SKIN: No ulceration or induration present.      LABS:                        8.7    10.40 )-----------( 124      ( 26 Jun 2018 04:15 )             28.8     06-26    157<H>  |  108  |  98<HH>  ----------------------------<  151<H>  2.8<L>   |  32  |  1.8<H>    Ca    9.0      26 Jun 2018 04:15  Phos  3.6     06-24  Mg     2.6     06-26    TPro  5.9<L>  /  Alb  2.6<L>  /  TBili  0.7  /  DBili  0.3<H>  /  AST  13  /  ALT  5   /  AlkPhos  52  06-26    PT/INR - ( 25 Jun 2018 04:17 )   PT: 15.70 sec;   INR: 1.44 ratio         PTT - ( 25 Jun 2018 04:17 )  PTT:30.1 sec    ABG - ( 26 Jun 2018 07:14 )  pH, Arterial: 7.60  pH, Blood: x     /  pCO2: 36    /  pO2: 132   / HCO3: 35    / Base Excess: 12.3  /  SaO2: 99        375/14/40/5        CARDIAC MARKERS ( 25 Jun 2018 04:17 )  x     / 0.05 ng/mL / 18 U/L / x     / <1.0 ng/mL  CARDIAC MARKERS ( 24 Jun 2018 07:59 )  x     / 0.05 ng/mL / x     / x     / x            Serum Pro-Brain Natriuretic Peptide: 28514 pg/mL (06-23-18 @ 19:33)            06-25-18 @ 07:01  -  06-26-18 @ 07:00  --------------------------------------------------------  IN: 1619 mL / OUT: 3265 mL / NET: -1646 mL        MICROBIOLOGY:  Culture Results:   >100,000 CFU/ml Citrobacter freundii (06-23 @ 20:05)  Culture Results:   No growth to date. (06-23 @ 19:28)  Culture Results:   No growth to date. (06-23 @ 19:28)      MEDICATIONS  (STANDING):  acetazolamide    Tablet 250 milliGRAM(s) Oral every 12 hours  ALBUTerol/ipratropium for Nebulization 3 milliLiter(s) Nebulizer every 6 hours  allopurinol 300 milliGRAM(s) Oral daily  atorvastatin 10 milliGRAM(s) Oral at bedtime  chlorhexidine 0.12% Liquid 15 milliLiter(s) Swish and Spit two times a day  digoxin     Tablet 0.125 milliGRAM(s) Oral daily  docusate sodium Liquid 100 milliGRAM(s) Oral two times a day  dorzolamide 2% Ophthalmic Solution 1 Drop(s) Both EYES three times a day  furosemide   Injectable 40 milliGRAM(s) IV Push two times a day  latanoprost 0.005% Ophthalmic Solution 1 Drop(s) Both EYES at bedtime  levETIRAcetam  IVPB 500 milliGRAM(s) IV Intermittent every 12 hours  meropenem  IVPB      meropenem  IVPB 1000 milliGRAM(s) IV Intermittent every 12 hours  midazolam Infusion 0.02 mG/kG/Hr (1.57 mL/Hr) IV Continuous <Continuous>  norepinephrine Infusion 0.05 MICROgram(s)/kG/Min (7.359 mL/Hr) IV Continuous <Continuous>  pantoprazole  Injectable 40 milliGRAM(s) IV Push daily  potassium chloride  10 mEq/100 mL IVPB 10 milliEquivalent(s) IV Intermittent every 1 hour  senna 1 Tablet(s) Oral at bedtime  spironolactone 25 milliGRAM(s) Oral daily  timolol 0.25% Solution 1 Drop(s) Both EYES two times a day    MEDICATIONS  (PRN):      RADIOLOGY & ADDITIONAL STUDIES:

## 2018-06-26 NOTE — PROGRESS NOTE ADULT - ASSESSMENT
IMPRESSION: AHRF/ ACUTE SBH S/P FALL WAS ON ELIQUIS S/P NEURISX EVAL/ OFF HYPERTOINIC S/P PALLIATIVE CARE/ NERUSX NO INTERVENTION      PLAN:    CNS: DAILY SEDATION VACATION, MROPHINE OR FENTANYL    HEENT:  Oral care    PULMONARY:  HOB @ 45 degrees, DEC FIO2    CARDIOVASCULAR: DC LASIX, SEVERE AS    GI: GI prophylaxis                                          Feeding START FEEDING    RENAL:  F/u  lytes.  Correct as needed. accurate I/O F/UP CMP    INFECTIOUS DISEASE: CHANGE TO CIPRO    HEMATOLOGICAL:  SCD    ENDOCRINE:  Follow up FS.  Insulin protocol if needed.    CODE STATUS: DNR    DISPOSITION: Pt requires continued monitoring in the MICU    PALLIATIVE CARE EVAL/ ? FOR LIBERATION

## 2018-06-26 NOTE — PROGRESS NOTE ADULT - SUBJECTIVE AND OBJECTIVE BOX
77yFemale with diagnosis: CHF EXACERBATION ELEAVTED TROPONIN PNEUMONIA      Patient seen, no significant change in status. REmains unresponsive. No pain/distress.  Family at bedside.       PHYSICAL EXAM unchanged    T(C): , Max: 38.3 (16:00)  T(F): 99.3  HR: 100 (86 - 118)  BP: 101/47 (100/38 - 131/42)  RR: 21 (14 - 32)  SpO2: 100% (99% - 100%)              LABS:                          8.7    10.40 )-----------( 124      ( 26 Jun 2018 04:15 )             28.8                                                                                      06-26    157<H>  |  108  |  98<HH>  ----------------------------<  151<H>  2.8<L>   |  32  |  1.8<H>    Ca    9.0      26 Jun 2018 04:15  Mg     2.6     06-26    TPro  5.9<L>  /  Alb  2.6<L>  /  TBili  0.7  /  DBili  0.3<H>  /  AST  13  /  ALT  5   /  AlkPhos  52  06-26                                                      MEDICATIONS  (STANDING):  acetazolamide    Tablet 250 milliGRAM(s) Oral every 12 hours  ALBUTerol/ipratropium for Nebulization 3 milliLiter(s) Nebulizer every 6 hours  allopurinol 300 milliGRAM(s) Oral daily  atorvastatin 10 milliGRAM(s) Oral at bedtime  chlorhexidine 0.12% Liquid 15 milliLiter(s) Swish and Spit two times a day  ciprofloxacin   IVPB 200 milliGRAM(s) IV Intermittent every 12 hours  ciprofloxacin   IVPB      digoxin     Tablet 0.125 milliGRAM(s) Oral daily  docusate sodium Liquid 100 milliGRAM(s) Oral two times a day  dorzolamide 2% Ophthalmic Solution 1 Drop(s) Both EYES three times a day  fentaNYL   Infusion. 0.5 MICROgram(s)/kG/Hr (3.705 mL/Hr) IV Continuous <Continuous>  latanoprost 0.005% Ophthalmic Solution 1 Drop(s) Both EYES at bedtime  levETIRAcetam  IVPB 500 milliGRAM(s) IV Intermittent every 12 hours  midazolam Infusion 0.02 mG/kG/Hr (1.57 mL/Hr) IV Continuous <Continuous>  norepinephrine Infusion 0.05 MICROgram(s)/kG/Min (7.359 mL/Hr) IV Continuous <Continuous>  pantoprazole  Injectable 40 milliGRAM(s) IV Push daily  senna 1 Tablet(s) Oral at bedtime  spironolactone 25 milliGRAM(s) Oral daily  timolol 0.25% Solution 1 Drop(s) Both EYES two times a day    MEDICATIONS  (PRN):

## 2018-06-26 NOTE — PROGRESS NOTE ADULT - ASSESSMENT
77yFemale being evaluated for goals of care.    Met w pt's son and family- clinical condition reviewed, overall poor prognosis known. Family defer surgical intervention. Requesting comfort measures only with vent withdrawal today. Family aware of likely progression to resp failure, cardiac arrest and death following withdrawal. NO further blood draws, no fingersticks, no artificial hydration/nutrition.       Recommendations:  Start morphine gtt 4mg/hr, morphine 4mg IVP q 30mg q 30 min pRN resp distress  ativan 1mg IV q30min PRN agitation  Robinul 0.4mg IVP  DNR/DNI status w CMO following withdrawal      Grave prognosis known

## 2018-06-26 NOTE — GOALS OF CARE CONVERSATION - PERSONAL ADVANCE DIRECTIVE - CONVERSATION DETAILS
Palliative care team met with family at bedside to discuss goals of care. Discussed current poor prognosis and treatment options. Family verbalized understanding and are requesting comfort care at this time.     No further mechanical ventilation, intubation. No further blood work or finger sticks. No artifical nutrition or IV hydration. Family is opting for elective palliative extubation.      Palliative care team will continue to monitor and provide support.

## 2018-06-26 NOTE — PROGRESS NOTE ADULT - SUBJECTIVE AND OBJECTIVE BOX
SUBJECTIVE:    Patient is a 77y old Female who presents with a chief complaint of Progressive SOB and lower extremity edema (23 Jun 2018 22:16)    Overnight Eevents:  Patient was seen at bed side this morning patient is still on versed MV with no acute event over night.    PAST MEDICAL & SURGICAL HISTORY  MI, old  Endocarditis  Glaucoma  Diabetes mellitus, type 2  Anemia  Depression  Chronic obstructive pulmonary disease  Sepsis  Gastroesophageal reflux disease  Atrial fibrillation  Hypertension  Kidney failure  No significant past surgical history    SOCIAL HISTORY:  Negative for smoking/alcohol/drug use.     ALLERGIES:  chocolate (Unknown)  No Known Drug Allergies    MEDICATIONS:  STANDING MEDICATIONS  acetazolamide    Tablet 250 milliGRAM(s) Oral every 12 hours  ALBUTerol/ipratropium for Nebulization 3 milliLiter(s) Nebulizer every 6 hours  allopurinol 300 milliGRAM(s) Oral daily  atorvastatin 10 milliGRAM(s) Oral at bedtime  chlorhexidine 0.12% Liquid 15 milliLiter(s) Swish and Spit two times a day  digoxin     Tablet 0.125 milliGRAM(s) Oral daily  docusate sodium Liquid 100 milliGRAM(s) Oral two times a day  dorzolamide 2% Ophthalmic Solution 1 Drop(s) Both EYES three times a day  furosemide   Injectable 40 milliGRAM(s) IV Push two times a day  latanoprost 0.005% Ophthalmic Solution 1 Drop(s) Both EYES at bedtime  levETIRAcetam  IVPB 500 milliGRAM(s) IV Intermittent every 12 hours  meropenem  IVPB      meropenem  IVPB 1000 milliGRAM(s) IV Intermittent every 12 hours  midazolam Infusion 0.02 mG/kG/Hr IV Continuous <Continuous>  norepinephrine Infusion 0.05 MICROgram(s)/kG/Min IV Continuous <Continuous>  pantoprazole  Injectable 40 milliGRAM(s) IV Push daily  potassium chloride  10 mEq/100 mL IVPB 10 milliEquivalent(s) IV Intermittent every 1 hour  senna 1 Tablet(s) Oral at bedtime  spironolactone 25 milliGRAM(s) Oral daily  timolol 0.25% Solution 1 Drop(s) Both EYES two times a day    PRN MEDICATIONS    VITALS:   T(F): 99.8  HR: 96  BP: 120/43  RR: 19  SpO2: 100%    PHYSICAL EXAM:  . General: NAD, on sedation, Mv  .HEENT: Rt pupil fixed and dialated non reactive to light  · Respiratory: Breath Sounds equal & clear to  auscultation, intubated MV  · Cardiovascular: Regular rate & rhythm, normal S1, S2; no murmurs, gallops or rubs; no S3, S4  · Gastrointestinal: Soft, non-tender, normal bowel sounds  · Extremities:	No cyanosis, clubbing or edema, 2+ edema b/l LE  · Skin no macular rashs, no lacerations.   . Neuro: sedated.    LABS:                        8.7    10.40 )-----------( 124      ( 26 Jun 2018 04:15 )             28.8     06-26    157<H>  |  108  |  98<HH>  ----------------------------<  151<H>  2.8<L>   |  32  |  1.8<H>    Ca    9.0      26 Jun 2018 04:15  Phos  3.6     06-24  Mg     2.6     06-26    TPro  5.9<L>  /  Alb  2.6<L>  /  TBili  0.7  /  DBili  0.3<H>  /  AST  13  /  ALT  5   /  AlkPhos  52  06-26    PT/INR - ( 25 Jun 2018 04:17 )   PT: 15.70 sec;   INR: 1.44 ratio         PTT - ( 25 Jun 2018 04:17 )  PTT:30.1 sec    ABG - ( 25 Jun 2018 16:17 )  pH, Arterial: 7.60  pH, Blood: x     /  pCO2: 37    /  pO2: 104   / HCO3: 37    / Base Excess: 14.2  /  SaO2: 100                   Culture - Urine (collected 23 Jun 2018 20:05)  Source: .Urine Catheterized  Final Report (25 Jun 2018 21:09):    >100,000 CFU/ml Citrobacter freundii  Organism: Citrobacter freundii (25 Jun 2018 21:09)  Organism: Citrobacter freundii (25 Jun 2018 21:09)    Culture - Blood (collected 23 Jun 2018 19:28)  Source: .Blood Blood  Preliminary Report (25 Jun 2018 01:01):    No growth to date.    Culture - Blood (collected 23 Jun 2018 19:28)  Source: .Blood Blood  Preliminary Report (25 Jun 2018 01:01):    No growth to date.      CARDIAC MARKERS ( 25 Jun 2018 04:17 )  x     / 0.05 ng/mL / 18 U/L / x     / <1.0 ng/mL  CARDIAC MARKERS ( 24 Jun 2018 07:59 )  x     / 0.05 ng/mL / x     / x     / x        Cardiology:  < from: Transthoracic Echocardiogram (06.25.18 @ 08:09) >  Patient Name:   GIOVANY PANIAGUA Accession #: [id number]  Medical Rec #:  [id number]      Height:  YOB: 1940       Weight:  Patient Age:    77 years       BSA:  Patient Gender: F              BP:          108/54 mmHg       Date of Exam:        6/25/2018 8:09:11 AM  Referring Physician: [id number] ANAND DORMAN  Sonographer:         Beronica Hurst  Reading Physician:   Paige Moreau M.D.    Procedure:   2D Echo/Doppler/Color Doppler Complete.  Indications: I50.9 - Heart Failure, unspecified  Diagnosis:   Heart failure, unspecified - I50.9         Summary:   1. Left ventricular ejection fraction, by visual estimation, is 50%.   2. Mildly decreased global left ventricular systolic function.   3. Moderate mitral valve regurgitation.   4. Peak transaortic gradient is 90.2 mmHg, mean transaortic gradient   equals 54.7 mmHg, the calculated aortic valve area equals 0.94 cm² by the   continuity equation consistent with severe aortic stenosis.   5. Severe aortic regurgitation.   6. Moderate tricuspid regurgitation.   7. Estimated pulmonary artery systolic pressure is 65.1 mmHg assuminga   right atrial pressure of 15 mmHg, which is consistent with severe   pulmonary hypertension.    PHYSICIAN INTERPRETATION:  Left Ventricle: The left ventricular internal cavity size is normal. Left   ventricular wall thickness is normal. Global LV systolic function was   mildly decreased. Left ventricular ejection fraction, by visual   estimation, is 50%.  Right Ventricle: Normal right ventricular size and function.  Left Atrium: Moderate to severe left atrial enlargement.  Right Atrium: Moderate to severe right atrial enlargement.  Pericardium: Trivial pericardial effusion is present.  Mitral Valve: Moderate mitral valve regurgitation is seen. Thickening of   the anterior and posterior mitral valve leaflets. There is moderate   mitral annular calcification.  Tricuspid Valve: Moderate tricuspid regurgitation is visualized. The   tricuspid valve is normal. Estimated pulmonary artery systolic pressure   is 65.1 mmHg assuming a right atrial pressure of 15 mmHg, which is   consistent with severe pulmonary hypertension.  Aortic Valve: Severe aortic valve regurgitation is seen. The aortic valve   is trileaflet. Peak Av velocity is 4.75 m/s, mean transaortic gradient   equals 54.7 mmHg, the calculated aortic valve area equals 0.94 cm² by the   continuity equation consistent with severe aortic stenosis.  Pulmonic Valve: Trace pulmonic valve regurgitation. The pulmonic valve is   normal.  Aorta: Aortic root measured at sinotubular junction is normal.  Venous: The inferior vena cava was dilated, with respiratory size   variation less than 50%, consistent with elevated right atrial pressure.       2D AND M-MODE MEASUREMENTS (normal ranges within parentheses):  Left Ventricle:                  Normal   Aorta/Left Atrium:               Normal  IVSd (2D):              0.87 cm (0.7-1.1) AoV Cusp Separation: 1.24 cm   (1.5-2.6)  LVPWd (2D):             1.06 cm (0.7-1.1) Left Atrium (Mmode): 4.59 cm   (1.9-4.0)  LVIDd (2D):             5.21 cm (3.4-5.7) Right Ventricle:  LVIDs (2D):       3.60 cm           RVd (Mmode):     3.25 cm  LV FS (2D):             31.0 %   (>25%)   RVd (2D):        3.07 cm  IVSd (Mmode):           0.83 cm (0.7-1.1)  LVPWd (Mmode):          1.00 cm (0.7-1.1)  LVIDd (Mmode):          5.75 cm (3.4-5.7)  LVIDs (Mmode):          3.55 cm  LV FS (Mmode):          38.2 %   (>25%)  Relative Wall Thickness  0.41    (<0.42)  Rel. Wall Thickness Mm   0.35    (<0.42)    SPECTRAL DOPPLER ANALYSIS:  LV DIASTOLIC FUNCTION:  MV Peak E: 1.55 m/s Decel Time: 128 msec  MV Peak A: 0.49 m/s  E/A Ratio: 3.18    Aortic Valve:  AoV VMax:    4.75 m/s  AoV Area, Vmax: 0.95 cm² Vmax Indx:  AoV VTI:     1.04 m    AoV Area, VTI:  0.94 cm² VTI Indx:  AoV Pk Grad: 90.2 mmHg  AoV Mn Grad: 54.7 mmHg    LVOT Vmax: 1.44 m/s  LVOT VTI:  0.31 m  LVOT Diam: 2.00 cm    Mitral Valve:  MV P1/2 Time: 37.12 msec  MV Area, PHT: 5.93 cm²    Tricuspid Valve and PA/RV Systolic Pressure: TR Max Velocity: 3.54 m/s RA   Pressure: 15 mmHg RVSP/PASP: 65.1 mmHg       [id number] Paige Moreau M.D., Electronically signed on 6/25/2018 at   11:36:27 AM              *** Final ***    < end of copied text >    Radiology:          06-25-18 @ 07:01  -  06-26-18 @ 07:00  --------------------------------------------------------  IN: 1617.5 mL / OUT: 3265 mL / NET: -1647.5 mL      Mode: AC/ CMV (Assist Control/ Continuous Mandatory Ventilation), RR (machine): 14, TV (machine): 375, FiO2: 40, PEEP: 5, ITime: 1, MAP: 11, PIP: 27 SUBJECTIVE:    Patient is a 77y old Female who presents with a chief complaint of Progressive SOB and lower extremity edema (23 Jun 2018 22:16)    Overnight Eevents:  Patient was seen at bed side this morning patient is still on versed MV with no acute event over night.    PAST MEDICAL & SURGICAL HISTORY  MI, old  Endocarditis  Glaucoma  Diabetes mellitus, type 2  Anemia  Depression  Chronic obstructive pulmonary disease  Sepsis  Gastroesophageal reflux disease  Atrial fibrillation  Hypertension  Kidney failure  No significant past surgical history    SOCIAL HISTORY:  Negative for smoking/alcohol/drug use.     ALLERGIES:  chocolate (Unknown)  No Known Drug Allergies    MEDICATIONS:  STANDING MEDICATIONS  acetazolamide    Tablet 250 milliGRAM(s) Oral every 12 hours  ALBUTerol/ipratropium for Nebulization 3 milliLiter(s) Nebulizer every 6 hours  allopurinol 300 milliGRAM(s) Oral daily  atorvastatin 10 milliGRAM(s) Oral at bedtime  chlorhexidine 0.12% Liquid 15 milliLiter(s) Swish and Spit two times a day  digoxin     Tablet 0.125 milliGRAM(s) Oral daily  docusate sodium Liquid 100 milliGRAM(s) Oral two times a day  dorzolamide 2% Ophthalmic Solution 1 Drop(s) Both EYES three times a day  furosemide   Injectable 40 milliGRAM(s) IV Push two times a day  latanoprost 0.005% Ophthalmic Solution 1 Drop(s) Both EYES at bedtime  levETIRAcetam  IVPB 500 milliGRAM(s) IV Intermittent every 12 hours  meropenem  IVPB      meropenem  IVPB 1000 milliGRAM(s) IV Intermittent every 12 hours  midazolam Infusion 0.02 mG/kG/Hr IV Continuous <Continuous>  norepinephrine Infusion 0.05 MICROgram(s)/kG/Min IV Continuous <Continuous>  pantoprazole  Injectable 40 milliGRAM(s) IV Push daily  potassium chloride  10 mEq/100 mL IVPB 10 milliEquivalent(s) IV Intermittent every 1 hour  senna 1 Tablet(s) Oral at bedtime  spironolactone 25 milliGRAM(s) Oral daily  timolol 0.25% Solution 1 Drop(s) Both EYES two times a day    PRN MEDICATIONS    VITALS:   T(F): 99.8  HR: 96  BP: 120/43  RR: 19  SpO2: 100%    PHYSICAL EXAM:  . General: NAD, on sedation, Mv  .HEENT: Rt pupil fixed and dialated non reactive to light  · Respiratory: Breath Sounds equal & clear to  auscultation, intubated MV  · Cardiovascular: Regular rate & rhythm, normal S1, S2; no murmurs, gallops or rubs; no S3, S4  · Gastrointestinal: Soft, non-tender, normal bowel sounds  · Extremities:	No cyanosis, clubbing or edema, 2+ edema b/l LE  · Skin no macular rashs, no lacerations.   . Neuro: sedated.    LABS:                        8.7    10.40 )-----------( 124      ( 26 Jun 2018 04:15 )             28.8     06-26    157<H>  |  108  |  98<HH>  ----------------------------<  151<H>  2.8<L>   |  32  |  1.8<H>    Ca    9.0      26 Jun 2018 04:15  Phos  3.6     06-24  Mg     2.6     06-26    TPro  5.9<L>  /  Alb  2.6<L>  /  TBili  0.7  /  DBili  0.3<H>  /  AST  13  /  ALT  5   /  AlkPhos  52  06-26    PT/INR - ( 25 Jun 2018 04:17 )   PT: 15.70 sec;   INR: 1.44 ratio         PTT - ( 25 Jun 2018 04:17 )  PTT:30.1 sec    ABG - ( 25 Jun 2018 16:17 )  pH, Arterial: 7.60  pH, Blood: x     /  pCO2: 37    /  pO2: 104   / HCO3: 37    / Base Excess: 14.2  /  SaO2: 100                   Culture - Urine (collected 23 Jun 2018 20:05)  Source: .Urine Catheterized  Final Report (25 Jun 2018 21:09):    >100,000 CFU/ml Citrobacter freundii  Organism: Citrobacter freundii (25 Jun 2018 21:09)  Organism: Citrobacter freundii (25 Jun 2018 21:09)    Culture - Blood (collected 23 Jun 2018 19:28)  Source: .Blood Blood  Preliminary Report (25 Jun 2018 01:01):    No growth to date.    Culture - Blood (collected 23 Jun 2018 19:28)  Source: .Blood Blood  Preliminary Report (25 Jun 2018 01:01):    No growth to date.      CARDIAC MARKERS ( 25 Jun 2018 04:17 )  x     / 0.05 ng/mL / 18 U/L / x     / <1.0 ng/mL  CARDIAC MARKERS ( 24 Jun 2018 07:59 )  x     / 0.05 ng/mL / x     / x     / x        Cardiology:  < from: Transthoracic Echocardiogram (06.25.18 @ 08:09) >  Patient Name:   GIOVANY PANIAGUA Accession #: [id number]  Medical Rec #:  [id number]      Height:  YOB: 1940       Weight:  Patient Age:    77 years       BSA:  Patient Gender: F              BP:          108/54 mmHg       Date of Exam:        6/25/2018 8:09:11 AM  Referring Physician: [id number] ANAND DORMAN  Sonographer:         Beronica Hurst  Reading Physician:   Paige Moreau M.D.    Procedure:   2D Echo/Doppler/Color Doppler Complete.  Indications: I50.9 - Heart Failure, unspecified  Diagnosis:   Heart failure, unspecified - I50.9         Summary:   1. Left ventricular ejection fraction, by visual estimation, is 50%.   2. Mildly decreased global left ventricular systolic function.   3. Moderate mitral valve regurgitation.   4. Peak transaortic gradient is 90.2 mmHg, mean transaortic gradient   equals 54.7 mmHg, the calculated aortic valve area equals 0.94 cm² by the   continuity equation consistent with severe aortic stenosis.   5. Severe aortic regurgitation.   6. Moderate tricuspid regurgitation.   7. Estimated pulmonary artery systolic pressure is 65.1 mmHg assuminga   right atrial pressure of 15 mmHg, which is consistent with severe   pulmonary hypertension.    PHYSICIAN INTERPRETATION:  Left Ventricle: The left ventricular internal cavity size is normal. Left   ventricular wall thickness is normal. Global LV systolic function was   mildly decreased. Left ventricular ejection fraction, by visual   estimation, is 50%.  Right Ventricle: Normal right ventricular size and function.  Left Atrium: Moderate to severe left atrial enlargement.  Right Atrium: Moderate to severe right atrial enlargement.  Pericardium: Trivial pericardial effusion is present.  Mitral Valve: Moderate mitral valve regurgitation is seen. Thickening of   the anterior and posterior mitral valve leaflets. There is moderate   mitral annular calcification.  Tricuspid Valve: Moderate tricuspid regurgitation is visualized. The   tricuspid valve is normal. Estimated pulmonary artery systolic pressure   is 65.1 mmHg assuming a right atrial pressure of 15 mmHg, which is   consistent with severe pulmonary hypertension.  Aortic Valve: Severe aortic valve regurgitation is seen. The aortic valve   is trileaflet. Peak Av velocity is 4.75 m/s, mean transaortic gradient   equals 54.7 mmHg, the calculated aortic valve area equals 0.94 cm² by the   continuity equation consistent with severe aortic stenosis.  Pulmonic Valve: Trace pulmonic valve regurgitation. The pulmonic valve is   normal.  Aorta: Aortic root measured at sinotubular junction is normal.  Venous: The inferior vena cava was dilated, with respiratory size   variation less than 50%, consistent with elevated right atrial pressure.       2D AND M-MODE MEASUREMENTS (normal ranges within parentheses):  Left Ventricle:                  Normal   Aorta/Left Atrium:               Normal  IVSd (2D):              0.87 cm (0.7-1.1) AoV Cusp Separation: 1.24 cm   (1.5-2.6)  LVPWd (2D):             1.06 cm (0.7-1.1) Left Atrium (Mmode): 4.59 cm   (1.9-4.0)  LVIDd (2D):             5.21 cm (3.4-5.7) Right Ventricle:  LVIDs (2D):       3.60 cm           RVd (Mmode):     3.25 cm  LV FS (2D):             31.0 %   (>25%)   RVd (2D):        3.07 cm  IVSd (Mmode):           0.83 cm (0.7-1.1)  LVPWd (Mmode):          1.00 cm (0.7-1.1)  LVIDd (Mmode):          5.75 cm (3.4-5.7)  LVIDs (Mmode):          3.55 cm  LV FS (Mmode):          38.2 %   (>25%)  Relative Wall Thickness  0.41    (<0.42)  Rel. Wall Thickness Mm   0.35    (<0.42)    SPECTRAL DOPPLER ANALYSIS:  LV DIASTOLIC FUNCTION:  MV Peak E: 1.55 m/s Decel Time: 128 msec  MV Peak A: 0.49 m/s  E/A Ratio: 3.18    Aortic Valve:  AoV VMax:    4.75 m/s  AoV Area, Vmax: 0.95 cm² Vmax Indx:  AoV VTI:     1.04 m    AoV Area, VTI:  0.94 cm² VTI Indx:  AoV Pk Grad: 90.2 mmHg  AoV Mn Grad: 54.7 mmHg    LVOT Vmax: 1.44 m/s  LVOT VTI:  0.31 m  LVOT Diam: 2.00 cm    Mitral Valve:  MV P1/2 Time: 37.12 msec  MV Area, PHT: 5.93 cm²    Tricuspid Valve and PA/RV Systolic Pressure: TR Max Velocity: 3.54 m/s RA   Pressure: 15 mmHg RVSP/PASP: 65.1 mmHg       [id number] Paige Moreau M.D., Electronically signed on 6/25/2018 at   11:36:27 AM              *** Final ***    < end of copied text >  < from: [facility] Duplex Lower Ext Vein Scan, Bilat (06.25.18 @ 10:51) >  INTERPRETATION:  Clinical History / Reason for exam: The patient is a 77   years old female with leg swelling. A venous duplex examination was   performed to evaluate the patient for deep venous thrombosis of the lower   extremities.    The common femoral, superficial femoral and popliteal veins were   visualized bilaterally with no evidence of deep venous thrombosis.    All these veins were fully compressible.  There was presence of   spontaneous flow, augmentation with distal compression and phasicity.    The right anterior tibial veins were not visualized right posterior   tibial veins were  patent  and  right peroneal veins were not   visualized.Venous thrombosis branch of right great saphenous vein in   distal calf was visualized.    The left anterior tibial veins were not visualized left posterior tibial   veins were  patent  and  left peroneal veins were not visualized    Impression:    No evidence of deep venous thrombosis in bilateral lower extremities.    Venous thrombosis branch of right great saphenous vein in distal calf.    ICD-10: M79.89                  JOANNE MACKEY M.D., ATTENDING VASCULAR  This document has been electronically signed. Jun 25 2018 11:24AM        < end of copied text >      Radiology:          06-25-18 @ 07:01  -  06-26-18 @ 07:00  --------------------------------------------------------  IN: 1617.5 mL / OUT: 3265 mL / NET: -1647.5 mL      Mode: AC/ CMV (Assist Control/ Continuous Mandatory Ventilation), RR (machine): 14, TV (machine): 375, FiO2: 40, PEEP: 5, ITime: 1, MAP: 11, PIP: 27

## 2018-06-29 LAB
CULTURE RESULTS: SIGNIFICANT CHANGE UP
CULTURE RESULTS: SIGNIFICANT CHANGE UP
SPECIMEN SOURCE: SIGNIFICANT CHANGE UP
SPECIMEN SOURCE: SIGNIFICANT CHANGE UP

## 2018-07-03 DIAGNOSIS — D64.9 ANEMIA, UNSPECIFIED: ICD-10-CM

## 2018-07-05 DIAGNOSIS — Z66 DO NOT RESUSCITATE: ICD-10-CM

## 2018-07-05 DIAGNOSIS — E87.4 MIXED DISORDER OF ACID-BASE BALANCE: ICD-10-CM

## 2018-07-05 DIAGNOSIS — I48.91 UNSPECIFIED ATRIAL FIBRILLATION: ICD-10-CM

## 2018-07-05 DIAGNOSIS — I13.0 HYPERTENSIVE HEART AND CHRONIC KIDNEY DISEASE WITH HEART FAILURE AND STAGE 1 THROUGH STAGE 4 CHRONIC KIDNEY DISEASE, OR UNSPECIFIED CHRONIC KIDNEY DISEASE: ICD-10-CM

## 2018-07-05 DIAGNOSIS — N18.9 CHRONIC KIDNEY DISEASE, UNSPECIFIED: ICD-10-CM

## 2018-07-05 DIAGNOSIS — I25.10 ATHEROSCLEROTIC HEART DISEASE OF NATIVE CORONARY ARTERY WITHOUT ANGINA PECTORIS: ICD-10-CM

## 2018-07-05 DIAGNOSIS — T83.511A INFECTION AND INFLAMMATORY REACTION DUE TO INDWELLING URETHRAL CATHETER, INITIAL ENCOUNTER: ICD-10-CM

## 2018-07-05 DIAGNOSIS — A41.9 SEPSIS, UNSPECIFIED ORGANISM: ICD-10-CM

## 2018-07-05 DIAGNOSIS — N17.9 ACUTE KIDNEY FAILURE, UNSPECIFIED: ICD-10-CM

## 2018-07-05 DIAGNOSIS — I50.33 ACUTE ON CHRONIC DIASTOLIC (CONGESTIVE) HEART FAILURE: ICD-10-CM

## 2018-07-05 DIAGNOSIS — J44.9 CHRONIC OBSTRUCTIVE PULMONARY DISEASE, UNSPECIFIED: ICD-10-CM

## 2018-07-05 DIAGNOSIS — Z91.018 ALLERGY TO OTHER FOODS: ICD-10-CM

## 2018-07-05 DIAGNOSIS — R79.89 OTHER SPECIFIED ABNORMAL FINDINGS OF BLOOD CHEMISTRY: ICD-10-CM

## 2018-07-05 DIAGNOSIS — J96.21 ACUTE AND CHRONIC RESPIRATORY FAILURE WITH HYPOXIA: ICD-10-CM

## 2018-07-05 DIAGNOSIS — D64.9 ANEMIA, UNSPECIFIED: ICD-10-CM

## 2018-07-05 DIAGNOSIS — I62.00 NONTRAUMATIC SUBDURAL HEMORRHAGE, UNSPECIFIED: ICD-10-CM

## 2018-07-05 DIAGNOSIS — H40.9 UNSPECIFIED GLAUCOMA: ICD-10-CM

## 2018-07-05 DIAGNOSIS — J15.6 PNEUMONIA DUE TO OTHER GRAM-NEGATIVE BACTERIA: ICD-10-CM

## 2018-07-05 DIAGNOSIS — B96.89 OTHER SPECIFIED BACTERIAL AGENTS AS THE CAUSE OF DISEASES CLASSIFIED ELSEWHERE: ICD-10-CM

## 2018-07-05 DIAGNOSIS — G92 TOXIC ENCEPHALOPATHY: ICD-10-CM

## 2018-07-05 DIAGNOSIS — Z51.5 ENCOUNTER FOR PALLIATIVE CARE: ICD-10-CM

## 2018-07-05 DIAGNOSIS — Z79.01 LONG TERM (CURRENT) USE OF ANTICOAGULANTS: ICD-10-CM
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
## 2018-07-05 DIAGNOSIS — N39.0 URINARY TRACT INFECTION, SITE NOT SPECIFIED: ICD-10-CM

## 2018-07-05 DIAGNOSIS — K21.9 GASTRO-ESOPHAGEAL REFLUX DISEASE WITHOUT ESOPHAGITIS: ICD-10-CM

## 2018-07-05 DIAGNOSIS — Z87.891 PERSONAL HISTORY OF NICOTINE DEPENDENCE: ICD-10-CM

## 2018-09-13 ENCOUNTER — APPOINTMENT (OUTPATIENT)
Dept: HEMATOLOGY ONCOLOGY | Facility: CLINIC | Age: 78
End: 2018-09-13

## 2018-09-20 LAB — BACTERIA WND CULT: NORMAL

## 2018-11-05 NOTE — ED ADULT TRIAGE NOTE - NSWEIGHTCALCTOOLDRUG_GEN_A_CORE
I have reviewed discharge instructions with the patient. The patient verbalized understanding. Patient left ED via Discharge Method: ambulatory to Home with self. Opportunity for questions and clarification provided. Patient given 0 scripts. To continue your aftercare when you leave the hospital, you may receive an automated call from our care team to check in on how you are doing. This is a free service and part of our promise to provide the best care and service to meet your aftercare needs.  If you have questions, or wish to unsubscribe from this service please call [phone]. Thank you for Choosing our [facility].  used
